# Patient Record
Sex: FEMALE | Race: WHITE | NOT HISPANIC OR LATINO | Employment: FULL TIME | URBAN - METROPOLITAN AREA
[De-identification: names, ages, dates, MRNs, and addresses within clinical notes are randomized per-mention and may not be internally consistent; named-entity substitution may affect disease eponyms.]

---

## 2018-07-22 ENCOUNTER — APPOINTMENT (OUTPATIENT)
Dept: RADIOLOGY | Facility: MEDICAL CENTER | Age: 53
DRG: 310 | End: 2018-07-22
Attending: EMERGENCY MEDICINE

## 2018-07-22 ENCOUNTER — HOSPITAL ENCOUNTER (INPATIENT)
Facility: MEDICAL CENTER | Age: 53
LOS: 1 days | DRG: 310 | End: 2018-07-23
Attending: EMERGENCY MEDICINE | Admitting: HOSPITALIST

## 2018-07-22 DIAGNOSIS — I48.91 ATRIAL FIBRILLATION WITH RVR (HCC): ICD-10-CM

## 2018-07-22 PROBLEM — L30.4 INTERTRIGO: Status: ACTIVE | Noted: 2018-07-22

## 2018-07-22 PROBLEM — E87.6 HYPOKALEMIA: Status: ACTIVE | Noted: 2018-07-22

## 2018-07-22 LAB
ALBUMIN SERPL BCP-MCNC: 4.7 G/DL (ref 3.2–4.9)
ALBUMIN/GLOB SERPL: 1.6 G/DL
ALP SERPL-CCNC: 77 U/L (ref 30–99)
ALT SERPL-CCNC: 27 U/L (ref 2–50)
ANION GAP SERPL CALC-SCNC: 17 MMOL/L (ref 0–11.9)
APPEARANCE UR: CLEAR
APTT PPP: 107.9 SEC (ref 24.7–36)
APTT PPP: 29.7 SEC (ref 24.7–36)
AST SERPL-CCNC: 22 U/L (ref 12–45)
BACTERIA #/AREA URNS HPF: NEGATIVE /HPF
BASOPHILS # BLD AUTO: 0.6 % (ref 0–1.8)
BASOPHILS # BLD: 0.04 K/UL (ref 0–0.12)
BILIRUB SERPL-MCNC: 0.5 MG/DL (ref 0.1–1.5)
BILIRUB UR QL STRIP.AUTO: NEGATIVE
BNP SERPL-MCNC: 11 PG/ML (ref 0–100)
BUN SERPL-MCNC: 10 MG/DL (ref 8–22)
CALCIUM SERPL-MCNC: 9.9 MG/DL (ref 8.5–10.5)
CHLORIDE SERPL-SCNC: 109 MMOL/L (ref 96–112)
CO2 SERPL-SCNC: 16 MMOL/L (ref 20–33)
COLOR UR: YELLOW
CREAT SERPL-MCNC: 0.75 MG/DL (ref 0.5–1.4)
DEPRECATED D DIMER PPP IA-ACNC: <200 NG/ML(D-DU)
EKG IMPRESSION: NORMAL
EOSINOPHIL # BLD AUTO: 0.08 K/UL (ref 0–0.51)
EOSINOPHIL NFR BLD: 1.3 % (ref 0–6.9)
EPI CELLS #/AREA URNS HPF: NEGATIVE /HPF
ERYTHROCYTE [DISTWIDTH] IN BLOOD BY AUTOMATED COUNT: 39.6 FL (ref 35.9–50)
GLOBULIN SER CALC-MCNC: 2.9 G/DL (ref 1.9–3.5)
GLUCOSE SERPL-MCNC: 155 MG/DL (ref 65–99)
GLUCOSE UR STRIP.AUTO-MCNC: NEGATIVE MG/DL
HCT VFR BLD AUTO: 43.4 % (ref 37–47)
HGB BLD-MCNC: 15.1 G/DL (ref 12–16)
HYALINE CASTS #/AREA URNS LPF: ABNORMAL /LPF
IMM GRANULOCYTES # BLD AUTO: 0.02 K/UL (ref 0–0.11)
IMM GRANULOCYTES NFR BLD AUTO: 0.3 % (ref 0–0.9)
INR PPP: 1.05 (ref 0.87–1.13)
KETONES UR STRIP.AUTO-MCNC: 15 MG/DL
LEUKOCYTE ESTERASE UR QL STRIP.AUTO: NEGATIVE
LYMPHOCYTES # BLD AUTO: 3.25 K/UL (ref 1–4.8)
LYMPHOCYTES NFR BLD: 52.3 % (ref 22–41)
MAGNESIUM SERPL-MCNC: 1.8 MG/DL (ref 1.5–2.5)
MCH RBC QN AUTO: 30.4 PG (ref 27–33)
MCHC RBC AUTO-ENTMCNC: 34.8 G/DL (ref 33.6–35)
MCV RBC AUTO: 87.3 FL (ref 81.4–97.8)
MICRO URNS: ABNORMAL
MONOCYTES # BLD AUTO: 0.51 K/UL (ref 0–0.85)
MONOCYTES NFR BLD AUTO: 8.2 % (ref 0–13.4)
NEUTROPHILS # BLD AUTO: 2.31 K/UL (ref 2–7.15)
NEUTROPHILS NFR BLD: 37.3 % (ref 44–72)
NITRITE UR QL STRIP.AUTO: NEGATIVE
NRBC # BLD AUTO: 0 K/UL
NRBC BLD-RTO: 0 /100 WBC
PH UR STRIP.AUTO: 7.5 [PH]
PLATELET # BLD AUTO: 276 K/UL (ref 164–446)
PMV BLD AUTO: 9.5 FL (ref 9–12.9)
POTASSIUM SERPL-SCNC: 3.4 MMOL/L (ref 3.6–5.5)
PROT SERPL-MCNC: 7.6 G/DL (ref 6–8.2)
PROT UR QL STRIP: NEGATIVE MG/DL
PROTHROMBIN TIME: 13.4 SEC (ref 12–14.6)
RBC # BLD AUTO: 4.97 M/UL (ref 4.2–5.4)
RBC # URNS HPF: ABNORMAL /HPF
RBC UR QL AUTO: ABNORMAL
SODIUM SERPL-SCNC: 142 MMOL/L (ref 135–145)
SP GR UR STRIP.AUTO: 1.01
T4 FREE SERPL-MCNC: 1.06 NG/DL (ref 0.53–1.43)
TROPONIN I SERPL-MCNC: 0.03 NG/ML (ref 0–0.04)
TROPONIN I SERPL-MCNC: <0.01 NG/ML (ref 0–0.04)
TSH SERPL DL<=0.005 MIU/L-ACNC: 3.26 UIU/ML (ref 0.38–5.33)
UROBILINOGEN UR STRIP.AUTO-MCNC: 0.2 MG/DL
WBC # BLD AUTO: 6.2 K/UL (ref 4.8–10.8)
WBC #/AREA URNS HPF: ABNORMAL /HPF

## 2018-07-22 PROCEDURE — 700102 HCHG RX REV CODE 250 W/ 637 OVERRIDE(OP): Performed by: EMERGENCY MEDICINE

## 2018-07-22 PROCEDURE — A9270 NON-COVERED ITEM OR SERVICE: HCPCS | Performed by: INTERNAL MEDICINE

## 2018-07-22 PROCEDURE — 83880 ASSAY OF NATRIURETIC PEPTIDE: CPT

## 2018-07-22 PROCEDURE — 71045 X-RAY EXAM CHEST 1 VIEW: CPT

## 2018-07-22 PROCEDURE — 81001 URINALYSIS AUTO W/SCOPE: CPT

## 2018-07-22 PROCEDURE — 700102 HCHG RX REV CODE 250 W/ 637 OVERRIDE(OP): Performed by: INTERNAL MEDICINE

## 2018-07-22 PROCEDURE — 700111 HCHG RX REV CODE 636 W/ 250 OVERRIDE (IP): Performed by: HOSPITALIST

## 2018-07-22 PROCEDURE — 83735 ASSAY OF MAGNESIUM: CPT

## 2018-07-22 PROCEDURE — 84439 ASSAY OF FREE THYROXINE: CPT

## 2018-07-22 PROCEDURE — 770020 HCHG ROOM/CARE - TELE (206)

## 2018-07-22 PROCEDURE — 700111 HCHG RX REV CODE 636 W/ 250 OVERRIDE (IP): Performed by: EMERGENCY MEDICINE

## 2018-07-22 PROCEDURE — 84484 ASSAY OF TROPONIN QUANT: CPT

## 2018-07-22 PROCEDURE — 36415 COLL VENOUS BLD VENIPUNCTURE: CPT

## 2018-07-22 PROCEDURE — 85379 FIBRIN DEGRADATION QUANT: CPT

## 2018-07-22 PROCEDURE — 700102 HCHG RX REV CODE 250 W/ 637 OVERRIDE(OP): Performed by: HOSPITALIST

## 2018-07-22 PROCEDURE — 84443 ASSAY THYROID STIM HORMONE: CPT

## 2018-07-22 PROCEDURE — 99285 EMERGENCY DEPT VISIT HI MDM: CPT

## 2018-07-22 PROCEDURE — 99223 1ST HOSP IP/OBS HIGH 75: CPT | Performed by: HOSPITALIST

## 2018-07-22 PROCEDURE — 96376 TX/PRO/DX INJ SAME DRUG ADON: CPT

## 2018-07-22 PROCEDURE — 93005 ELECTROCARDIOGRAM TRACING: CPT

## 2018-07-22 PROCEDURE — 85610 PROTHROMBIN TIME: CPT

## 2018-07-22 PROCEDURE — A9270 NON-COVERED ITEM OR SERVICE: HCPCS | Performed by: HOSPITALIST

## 2018-07-22 PROCEDURE — 85025 COMPLETE CBC W/AUTO DIFF WBC: CPT

## 2018-07-22 PROCEDURE — 80053 COMPREHEN METABOLIC PANEL: CPT

## 2018-07-22 PROCEDURE — A9270 NON-COVERED ITEM OR SERVICE: HCPCS | Performed by: EMERGENCY MEDICINE

## 2018-07-22 PROCEDURE — 85730 THROMBOPLASTIN TIME PARTIAL: CPT

## 2018-07-22 PROCEDURE — 96365 THER/PROPH/DIAG IV INF INIT: CPT

## 2018-07-22 PROCEDURE — 700111 HCHG RX REV CODE 636 W/ 250 OVERRIDE (IP)

## 2018-07-22 PROCEDURE — 96375 TX/PRO/DX INJ NEW DRUG ADDON: CPT

## 2018-07-22 RX ORDER — POLYETHYLENE GLYCOL 3350 17 G/17G
1 POWDER, FOR SOLUTION ORAL
Status: DISCONTINUED | OUTPATIENT
Start: 2018-07-22 | End: 2018-07-23 | Stop reason: HOSPADM

## 2018-07-22 RX ORDER — AMOXICILLIN 250 MG
2 CAPSULE ORAL 2 TIMES DAILY
Status: DISCONTINUED | OUTPATIENT
Start: 2018-07-22 | End: 2018-07-23 | Stop reason: HOSPADM

## 2018-07-22 RX ORDER — PROMETHAZINE HYDROCHLORIDE 25 MG/1
12.5-25 TABLET ORAL EVERY 4 HOURS PRN
Status: DISCONTINUED | OUTPATIENT
Start: 2018-07-22 | End: 2018-07-23 | Stop reason: HOSPADM

## 2018-07-22 RX ORDER — M-VIT,TX,IRON,MINS/CALC/FOLIC 27MG-0.4MG
1 TABLET ORAL DAILY
COMMUNITY
End: 2021-05-17

## 2018-07-22 RX ORDER — POTASSIUM CHLORIDE 20 MEQ/1
20 TABLET, EXTENDED RELEASE ORAL ONCE
Status: COMPLETED | OUTPATIENT
Start: 2018-07-22 | End: 2018-07-22

## 2018-07-22 RX ORDER — DIGOXIN 0.25 MG/ML
250 INJECTION INTRAMUSCULAR; INTRAVENOUS ONCE
Status: COMPLETED | OUTPATIENT
Start: 2018-07-22 | End: 2018-07-22

## 2018-07-22 RX ORDER — ASCORBIC ACID 500 MG
500 TABLET ORAL DAILY
COMMUNITY

## 2018-07-22 RX ORDER — ONDANSETRON 4 MG/1
4 TABLET, ORALLY DISINTEGRATING ORAL EVERY 4 HOURS PRN
Status: DISCONTINUED | OUTPATIENT
Start: 2018-07-22 | End: 2018-07-23 | Stop reason: HOSPADM

## 2018-07-22 RX ORDER — POTASSIUM CHLORIDE 20 MEQ/1
40 TABLET, EXTENDED RELEASE ORAL ONCE
Status: COMPLETED | OUTPATIENT
Start: 2018-07-22 | End: 2018-07-22

## 2018-07-22 RX ORDER — ZINC SULFATE 50(220)MG
220 CAPSULE ORAL DAILY
COMMUNITY

## 2018-07-22 RX ORDER — MAGNESIUM SULFATE HEPTAHYDRATE 40 MG/ML
2 INJECTION, SOLUTION INTRAVENOUS ONCE
Status: COMPLETED | OUTPATIENT
Start: 2018-07-22 | End: 2018-07-22

## 2018-07-22 RX ORDER — DIGOXIN 0.25 MG/ML
500 INJECTION INTRAMUSCULAR; INTRAVENOUS ONCE
Status: DISCONTINUED | OUTPATIENT
Start: 2018-07-22 | End: 2018-07-22

## 2018-07-22 RX ORDER — DILTIAZEM HYDROCHLORIDE 5 MG/ML
INJECTION INTRAVENOUS
Status: COMPLETED
Start: 2018-07-22 | End: 2018-07-22

## 2018-07-22 RX ORDER — HEPARIN SODIUM 1000 [USP'U]/ML
3200 INJECTION, SOLUTION INTRAVENOUS; SUBCUTANEOUS PRN
Status: DISCONTINUED | OUTPATIENT
Start: 2018-07-22 | End: 2018-07-23

## 2018-07-22 RX ORDER — BISACODYL 10 MG
10 SUPPOSITORY, RECTAL RECTAL
Status: DISCONTINUED | OUTPATIENT
Start: 2018-07-22 | End: 2018-07-23 | Stop reason: HOSPADM

## 2018-07-22 RX ORDER — DILTIAZEM HYDROCHLORIDE 5 MG/ML
10 INJECTION INTRAVENOUS ONCE
Status: COMPLETED | OUTPATIENT
Start: 2018-07-22 | End: 2018-07-22

## 2018-07-22 RX ORDER — HEPARIN SODIUM 1000 [USP'U]/ML
6000 INJECTION, SOLUTION INTRAVENOUS; SUBCUTANEOUS ONCE
Status: COMPLETED | OUTPATIENT
Start: 2018-07-22 | End: 2018-07-22

## 2018-07-22 RX ORDER — CLOTRIMAZOLE 1 %
CREAM (GRAM) TOPICAL 2 TIMES DAILY
Status: DISCONTINUED | OUTPATIENT
Start: 2018-07-22 | End: 2018-07-23 | Stop reason: HOSPADM

## 2018-07-22 RX ORDER — PROMETHAZINE HYDROCHLORIDE 25 MG/1
12.5-25 SUPPOSITORY RECTAL EVERY 4 HOURS PRN
Status: DISCONTINUED | OUTPATIENT
Start: 2018-07-22 | End: 2018-07-23 | Stop reason: HOSPADM

## 2018-07-22 RX ORDER — ONDANSETRON 2 MG/ML
4 INJECTION INTRAMUSCULAR; INTRAVENOUS EVERY 4 HOURS PRN
Status: DISCONTINUED | OUTPATIENT
Start: 2018-07-22 | End: 2018-07-23 | Stop reason: HOSPADM

## 2018-07-22 RX ORDER — LORAZEPAM 1 MG/1
1 TABLET ORAL ONCE
Status: COMPLETED | OUTPATIENT
Start: 2018-07-22 | End: 2018-07-22

## 2018-07-22 RX ADMIN — DIGOXIN 250 MCG: 250 INJECTION, SOLUTION INTRAMUSCULAR; INTRAVENOUS at 11:35

## 2018-07-22 RX ADMIN — DILTIAZEM HYDROCHLORIDE 60 MG: 30 TABLET, FILM COATED ORAL at 23:51

## 2018-07-22 RX ADMIN — MAGNESIUM SULFATE IN WATER 2 G: 40 INJECTION, SOLUTION INTRAVENOUS at 19:24

## 2018-07-22 RX ADMIN — CLOTRIMAZOLE: 10 CREAM TOPICAL at 19:25

## 2018-07-22 RX ADMIN — DILTIAZEM HYDROCHLORIDE 30 MG: 30 TABLET, FILM COATED ORAL at 13:48

## 2018-07-22 RX ADMIN — DILTIAZEM HYDROCHLORIDE 60 MG: 30 TABLET, FILM COATED ORAL at 18:32

## 2018-07-22 RX ADMIN — LORAZEPAM 1 MG: 1 TABLET ORAL at 12:11

## 2018-07-22 RX ADMIN — POTASSIUM CHLORIDE 40 MEQ: 1500 TABLET, EXTENDED RELEASE ORAL at 19:24

## 2018-07-22 RX ADMIN — POTASSIUM CHLORIDE 20 MEQ: 1500 TABLET, EXTENDED RELEASE ORAL at 13:49

## 2018-07-22 RX ADMIN — HEPARIN SODIUM 6000 UNITS: 1000 INJECTION, SOLUTION INTRAVENOUS; SUBCUTANEOUS at 12:49

## 2018-07-22 RX ADMIN — DILTIAZEM HYDROCHLORIDE 10 MG: 5 INJECTION INTRAVENOUS at 10:36

## 2018-07-22 RX ADMIN — DIGOXIN 250 MCG: 0.25 INJECTION INTRAMUSCULAR; INTRAVENOUS at 15:12

## 2018-07-22 RX ADMIN — HEPARIN SODIUM 1200 UNITS/HR: 5000 INJECTION, SOLUTION INTRAVENOUS at 12:50

## 2018-07-22 RX ADMIN — DILTIAZEM HYDROCHLORIDE 10 MG: 5 INJECTION INTRAVENOUS at 11:09

## 2018-07-22 RX ADMIN — HEPARIN SODIUM 1200 UNITS/HR: 5000 INJECTION, SOLUTION INTRAVENOUS at 13:28

## 2018-07-22 RX ADMIN — METOPROLOL TARTRATE 25 MG: 25 TABLET, FILM COATED ORAL at 18:32

## 2018-07-22 ASSESSMENT — COGNITIVE AND FUNCTIONAL STATUS - GENERAL
SUGGESTED CMS G CODE MODIFIER MOBILITY: CH
DAILY ACTIVITIY SCORE: 24
MOBILITY SCORE: 24
SUGGESTED CMS G CODE MODIFIER DAILY ACTIVITY: CH

## 2018-07-22 ASSESSMENT — PATIENT HEALTH QUESTIONNAIRE - PHQ9
SUM OF ALL RESPONSES TO PHQ9 QUESTIONS 1 AND 2: 0
1. LITTLE INTEREST OR PLEASURE IN DOING THINGS: NOT AT ALL
2. FEELING DOWN, DEPRESSED, IRRITABLE, OR HOPELESS: NOT AT ALL
SUM OF ALL RESPONSES TO PHQ9 QUESTIONS 1 AND 2: 0
2. FEELING DOWN, DEPRESSED, IRRITABLE, OR HOPELESS: NOT AT ALL
1. LITTLE INTEREST OR PLEASURE IN DOING THINGS: NOT AT ALL

## 2018-07-22 ASSESSMENT — LIFESTYLE VARIABLES
ALCOHOL_USE: NO
SUBSTANCE_ABUSE: 0
EVER_SMOKED: NEVER

## 2018-07-22 ASSESSMENT — ENCOUNTER SYMPTOMS
HEADACHES: 0
FOCAL WEAKNESS: 0
FEVER: 0
WEAKNESS: 1
BACK PAIN: 0
COUGH: 0
TINGLING: 1
DIARRHEA: 0
SORE THROAT: 0
BLURRED VISION: 0
DOUBLE VISION: 0
CHILLS: 0
NERVOUS/ANXIOUS: 0
INSOMNIA: 0
NAUSEA: 0
ABDOMINAL PAIN: 0
DIZZINESS: 1
SHORTNESS OF BREATH: 0
PALPITATIONS: 1

## 2018-07-22 ASSESSMENT — PAIN SCALES - GENERAL
PAINLEVEL_OUTOF10: 0

## 2018-07-22 NOTE — PROGRESS NOTES
Received the patient from the ed. Patient alert and oriented, on room air, no chest pain reported. Patient on heparin 1200u/hr. Patient placed on tele. Her heart rate remains in the 150's, 60mg po administered. Dr Ronan rodriguez.

## 2018-07-22 NOTE — ED TRIAGE NOTES
54 y/o female presents to ED via EMS after she was found to be in rapid a. Fib this am.  Patient reports awaking with palpitations this am around 0900.  Has had some associated SOB as well. No recent illness. No CP, No HA

## 2018-07-22 NOTE — CONSULTS
Cardiology Initial Consult Note    Date of note:    7/22/2018      Consulting Physician: No att. providers found    Patient ID:    Name:   Snyder, Corinne A   YOB: 1965  Age:   53 y.o.  female   MRN:   [id number]      Reason for Consultation: palpitations.    HPI:  Corinne A Snyder is a 53 y.o.-year-old female with no past medical history who presents with palpitations.     She reports palpitations on and off since she was 16 years old.  She saw a cardiologist once 11 years ago here at [facility] and does not think she had any echo or diagnosis at that time, it appears it was thought they were benign.      This morning she had acute onset of severe palpitations associated with lightheadedness and a cough which started last night while she was supine.  She does not a couple days of mild LE swelling.  She presented to the emergency room for evaluation and was noted to be in atrial fibrillation. Her rate remains elevated despite diltiazem and digoxin.  Cardiology was consulted for assistance in management.     Of note, she has no FH of atrial fibrillation, and no personal history of CVA, PVD, hypertension, diabetes.  She did have a history 3-4 years ago of transient visual disturbance which lasts for 1-2 minutes with no recurrence.       ROS  Constitutional: Negative for chills, fever, weakness, weight gain and weight loss. + night sweats, due to menopause.   Eyes: Negative for double vision, vision loss in left eye and vision loss in right eye.   Cardiovascular: see HPI.  Respiratory: Negative for shortness of breath, and wheezing.    Musculoskeletal: Negative for joint swelling, muscle cramps, muscle weakness and myalgias.   Gastrointestinal: Negative for abdominal pain, hematochezia, hemorrhoids and melena.   Genitourinary: Negative for frequency and hematuria.   Neurological: Negative for dizziness, focal weakness, light-headedness, numbness, paresthesias.     All others reviewed and negative.      Past  "Medical History:   Diagnosis Date   • Arthritis    • Back pain        Past Surgical History:   Procedure Laterality Date   • TONSILLECTOMY  1969         Prescriptions Prior to Admission   Medication Sig Dispense Refill Last Dose   • zinc sulfate (ZINCATE) 220 (50 Zn) MG Cap Take 220 mg by mouth every day.   7/21/2018 at Unknown time   • ascorbic acid (ASCORBIC ACID) 500 MG Tab Take 500 mg by mouth every day.   7/21/2018 at Unknown time   • therapeutic multivitamin-minerals (THERAGRAN-M) Tab Take 1 Tab by mouth every day.   7/21/2018 at Unknown time   • Cholecalciferol (VITAMIN D3 PO) Take 1 Cap by mouth every day.   7/21/2018 at Unknown time   • Methylsulfonylmethane (MSM) Powder Take 10 mL by mouth every day.   7/21/2018 at Unknown time           Allergies   Allergen Reactions   • Milk Products Food Allergy Diarrhea     Upset stomach   • Pcn [Penicillins] Hives         Family History   Problem Relation Age of Onset   • Cancer Mother      Breast Cancer         Social History     Social History   • Marital status:      Spouse name: N/A   • Number of children: N/A   • Years of education: N/A     Occupational History   • Not on file.     Social History Main Topics   • Smoking status: Never Smoker   • Smokeless tobacco: Never Used   • Alcohol use Yes      Comment: occasional   • Drug use: No   • Sexual activity: Yes     Partners: Male      Comment: vasectomy     Other Topics Concern   • Not on file     Social History Narrative   • No narrative on file         Physical Exam  Body mass index is 35.48 kg/m².  Blood pressure 120/94, pulse (!) 144, resp. rate 18, height 1.651 m (5' 5\"), weight 96.7 kg (213 lb 3 oz), SpO2 99 %, not currently breastfeeding.  Vitals:    07/22/18 1125 07/22/18 1133 07/22/18 1200 07/22/18 1232   BP: 120/94      Pulse: (!) 144      Resp:  19 18 18   SpO2:  98%  99%   Weight:       Height:         Oxygen Therapy:  Pulse Oximetry: 99 %    General: Well appearing and in no apparent " distress  Eyes: nl conjunctiva  ENT: OP clear  Neck: JVP 4 cm H2O, no carotid bruits  Lungs: normal respiratory effort, CTAB  Heart: RRR, no murmurs, no rubs or gallops, no edema bilateral lower extremities. No LV/RV heave on cardiac palpatation. 2+ bilateral radial pulses.  2+ bilateral dp pulses.   Abdomen: soft, non tender, non distended, no masses, normal bowel sounds.  No HSM.  Extremities/MSK: no clubbing, no cyanosis  Neurological: No focal sensory deficits  Psychiatric: Appropriate affect, A/O x 3  Skin: Warm extremities        Labs (personally reviewed and notable for):   TSH WNL  Trop negative  Potassium 3.4  Creatinine 0.8        Cardiac Imaging and Procedures Review:    EKG dated 7/22/2018: My personal interpretation is atrial fibrillation, repol abnormality      Radiology test Review:  CXR: 1. No acute cardiopulmonary abnormalities are identified.           Impression and Medical Decision Making:  # Atrial fibrillation with RVR        Recommendations:  # increase diltiazem to 60mg PO Q6 hours  # continue digoxin 125mcg PO Daily  # start metoprolol tartrate 25mg PO Q6 hours  # check echo  # NPO at midnight for possible EDGARDO and cardioversion tomorrow if rate not controlled  # if symptomatic hypotension on above regimen (SBP <100), would start IV amiodarone with gtt and stop digoxin  # continue heparin gtt for now in case of cardioversion, if echo WNL, then [id number] score of 1.      Thank you for allowing me to participate in the care of this patient, I will continue to follow.  Please contact me with any questions.      Laureano Caballero MD  Cardiologist, [facility] [place]   [phone]

## 2018-07-22 NOTE — CARE PLAN
Problem: Venous Thromboembolism (VTW)/Deep Vein Thrombosis (DVT) Prevention:  Goal: Patient will participate in Venous Thrombosis (VTE)/Deep Vein Thrombosis (DVT)Prevention Measures    Intervention: Ensure patient wears graduated elastic stockings (LARON hose) and/or SCDs, if ordered, when in bed or chair (Remove at least once per shift for skin check)  SCD on

## 2018-07-22 NOTE — PROGRESS NOTES
Patient hr had remained in the 150's from admission. MD paged and 250 mcg digoxin administered. HR now in the 115-120's

## 2018-07-22 NOTE — ED NOTES
d/w James RIZVI regarding patients HR (still 120s-160s).  ERP aware.  Placing orders for Cardizem and Digoxin.

## 2018-07-23 ENCOUNTER — PATIENT OUTREACH (OUTPATIENT)
Dept: HEALTH INFORMATION MANAGEMENT | Facility: OTHER | Age: 53
End: 2018-07-23

## 2018-07-23 VITALS
HEART RATE: 84 BPM | SYSTOLIC BLOOD PRESSURE: 110 MMHG | HEIGHT: 65 IN | DIASTOLIC BLOOD PRESSURE: 69 MMHG | RESPIRATION RATE: 17 BRPM | BODY MASS INDEX: 37.1 KG/M2 | OXYGEN SATURATION: 95 % | WEIGHT: 222.66 LBS | TEMPERATURE: 97.8 F

## 2018-07-23 LAB
ANION GAP SERPL CALC-SCNC: 6 MMOL/L (ref 0–11.9)
APTT PPP: 80.5 SEC (ref 24.7–36)
APTT PPP: 80.5 SEC (ref 24.7–36)
APTT PPP: 84.6 SEC (ref 24.7–36)
BUN SERPL-MCNC: 13 MG/DL (ref 8–22)
CALCIUM SERPL-MCNC: 8.7 MG/DL (ref 8.5–10.5)
CHLORIDE SERPL-SCNC: 108 MMOL/L (ref 96–112)
CO2 SERPL-SCNC: 24 MMOL/L (ref 20–33)
CREAT SERPL-MCNC: 0.73 MG/DL (ref 0.5–1.4)
ERYTHROCYTE [DISTWIDTH] IN BLOOD BY AUTOMATED COUNT: 44.3 FL (ref 35.9–50)
GLUCOSE SERPL-MCNC: 139 MG/DL (ref 65–99)
HCT VFR BLD AUTO: 41.3 % (ref 37–47)
HGB BLD-MCNC: 13.8 G/DL (ref 12–16)
LV EJECT FRACT  99904: 65
LV EJECT FRACT MOD 2C 99903: 64.95
LV EJECT FRACT MOD 4C 99902: 66.74
LV EJECT FRACT MOD BP 99901: 65.91
MCH RBC QN AUTO: 31 PG (ref 27–33)
MCHC RBC AUTO-ENTMCNC: 33.4 G/DL (ref 33.6–35)
MCV RBC AUTO: 92.8 FL (ref 81.4–97.8)
PLATELET # BLD AUTO: 241 K/UL (ref 164–446)
PMV BLD AUTO: 9.7 FL (ref 9–12.9)
POTASSIUM SERPL-SCNC: 4.4 MMOL/L (ref 3.6–5.5)
RBC # BLD AUTO: 4.45 M/UL (ref 4.2–5.4)
SODIUM SERPL-SCNC: 138 MMOL/L (ref 135–145)
WBC # BLD AUTO: 5.8 K/UL (ref 4.8–10.8)

## 2018-07-23 PROCEDURE — 99239 HOSP IP/OBS DSCHRG MGMT >30: CPT | Performed by: INTERNAL MEDICINE

## 2018-07-23 PROCEDURE — 700105 HCHG RX REV CODE 258

## 2018-07-23 PROCEDURE — A9270 NON-COVERED ITEM OR SERVICE: HCPCS | Performed by: INTERNAL MEDICINE

## 2018-07-23 PROCEDURE — 700111 HCHG RX REV CODE 636 W/ 250 OVERRIDE (IP): Performed by: INTERNAL MEDICINE

## 2018-07-23 PROCEDURE — 700111 HCHG RX REV CODE 636 W/ 250 OVERRIDE (IP): Performed by: EMERGENCY MEDICINE

## 2018-07-23 PROCEDURE — 700105 HCHG RX REV CODE 258: Performed by: INTERNAL MEDICINE

## 2018-07-23 PROCEDURE — 80048 BASIC METABOLIC PNL TOTAL CA: CPT

## 2018-07-23 PROCEDURE — 700102 HCHG RX REV CODE 250 W/ 637 OVERRIDE(OP): Performed by: INTERNAL MEDICINE

## 2018-07-23 PROCEDURE — 93306 TTE W/DOPPLER COMPLETE: CPT

## 2018-07-23 PROCEDURE — 36415 COLL VENOUS BLD VENIPUNCTURE: CPT

## 2018-07-23 PROCEDURE — 85730 THROMBOPLASTIN TIME PARTIAL: CPT

## 2018-07-23 PROCEDURE — 85027 COMPLETE CBC AUTOMATED: CPT

## 2018-07-23 PROCEDURE — 93306 TTE W/DOPPLER COMPLETE: CPT | Mod: 26 | Performed by: INTERNAL MEDICINE

## 2018-07-23 RX ORDER — SODIUM CHLORIDE 9 MG/ML
INJECTION, SOLUTION INTRAVENOUS
Status: COMPLETED
Start: 2018-07-23 | End: 2018-07-23

## 2018-07-23 RX ORDER — AMIODARONE HYDROCHLORIDE 200 MG/1
200 TABLET ORAL 2 TIMES DAILY
Qty: 30 TAB | Refills: 0 | Status: SHIPPED | OUTPATIENT
Start: 2018-07-23 | End: 2018-07-24

## 2018-07-23 RX ORDER — AMIODARONE HYDROCHLORIDE 200 MG/1
200 TABLET ORAL TWICE DAILY
Status: DISCONTINUED | OUTPATIENT
Start: 2018-07-23 | End: 2018-07-23 | Stop reason: HOSPADM

## 2018-07-23 RX ORDER — WARFARIN SODIUM 5 MG/1
5 TABLET ORAL DAILY
Qty: 30 TAB | Refills: 3 | Status: SHIPPED | OUTPATIENT
Start: 2018-07-23 | End: 2018-07-24

## 2018-07-23 RX ORDER — WARFARIN SODIUM 5 MG/1
5 TABLET ORAL
Status: COMPLETED | OUTPATIENT
Start: 2018-07-23 | End: 2018-07-23

## 2018-07-23 RX ADMIN — HEPARIN SODIUM 1200 UNITS/HR: 5000 INJECTION, SOLUTION INTRAVENOUS at 10:19

## 2018-07-23 RX ADMIN — SODIUM CHLORIDE 250 ML: 9 INJECTION, SOLUTION INTRAVENOUS at 09:09

## 2018-07-23 RX ADMIN — METOPROLOL TARTRATE 25 MG: 25 TABLET, FILM COATED ORAL at 04:59

## 2018-07-23 RX ADMIN — AMIODARONE HYDROCHLORIDE 200 MG: 200 TABLET ORAL at 18:19

## 2018-07-23 RX ADMIN — AMIODARONE HYDROCHLORIDE 150 MG: 50 INJECTION, SOLUTION INTRAVENOUS at 09:08

## 2018-07-23 RX ADMIN — WARFARIN SODIUM 5 MG: 5 TABLET ORAL at 18:19

## 2018-07-23 RX ADMIN — CLOTRIMAZOLE: 10 CREAM TOPICAL at 05:01

## 2018-07-23 RX ADMIN — DILTIAZEM HYDROCHLORIDE 60 MG: 30 TABLET, FILM COATED ORAL at 04:59

## 2018-07-23 RX ADMIN — CLOTRIMAZOLE: 10 CREAM TOPICAL at 18:21

## 2018-07-23 ASSESSMENT — ENCOUNTER SYMPTOMS
PALPITATIONS: 1
NEUROLOGICAL NEGATIVE: 1
FEVER: 0

## 2018-07-23 ASSESSMENT — PAIN SCALES - GENERAL
PAINLEVEL_OUTOF10: 0

## 2018-07-23 NOTE — ED PROVIDER NOTES
ED Provider Note    CHIEF COMPLAINT  Chief Complaint   Patient presents with   • Rapid Heart Beat       HPI  Corinne A Snyder is a 53 y.o. female who presents to emergency today with elevated heart rate. Patient states this started at 9:00 AM. She's had history of palpitations in the past and has been seen by physicians in the past including Holter monitor which per patient was negative. Today it did not go away she had lightheaded and dizziness chest pain shortness of breath. Presented in severe distress with atrial fibrillation with rapid ventricular response 160 beats per minute. No fever chills no change in bowel or bladder chest pain described as tightness currently gone. Patient presents anxious on examination. Severe distress secondary to elevated heart rate with new-onset atrial fibrillation.    REVIEW OF SYSTEMS  See HPI for further details. All other systems are negative.     PAST MEDICAL HISTORY  Past Medical History:   Diagnosis Date   • Arthritis    • Back pain        FAMILY HISTORY  [unfilled]    SOCIAL HISTORY  Social History     Social History   • Marital status:      Spouse name: N/A   • Number of children: N/A   • Years of education: N/A     Social History Main Topics   • Smoking status: Never Smoker   • Smokeless tobacco: Never Used   • Alcohol use Yes      Comment: occasional   • Drug use: No   • Sexual activity: Yes     Partners: Male      Comment: vasectomy     Other Topics Concern   • Not on file     Social History Narrative   • No narrative on file       SURGICAL HISTORY  Past Surgical History:   Procedure Laterality Date   • TONSILLECTOMY  1969       CURRENT MEDICATIONS  Home Medications     Reviewed by Delaney Escalera (Pharmacy Tech) on 07/22/18 at 1535  Med List Status: Complete   Medication Last Dose Status   ascorbic acid (ASCORBIC ACID) 500 MG Tab 7/21/2018 Active   Cholecalciferol (VITAMIN D3 PO) 7/21/2018 Active   Methylsulfonylmethane (MSM) Powder 7/21/2018 Active  "  therapeutic multivitamin-minerals (THERAGRAN-M) Tab 7/21/2018 Active   zinc sulfate (ZINCATE) 220 (50 Zn) MG Cap 7/21/2018 Active                ALLERGIES  Allergies   Allergen Reactions   • Pcn [Penicillins] Hives       PHYSICAL EXAM  VITAL SIGNS: /58   Pulse (!) 126   Temp 36.6 °C (97.8 °F)   Resp 18   Ht 1.651 m (5' 5\")   Wt 97 kg (213 lb 13.5 oz)   SpO2 98%   Breastfeeding? No   BMI 35.59 kg/m²  Room air O2: 99    Constitutional: Well developed, Well nourished, severe acute distress, Non-toxic appearance.   HENT: Normocephalic, Atraumatic, Bilateral external ears normal, Oropharynx moist, No oral exudates, Nose normal.   Eyes: PERRLA, EOMI, Conjunctiva normal, No discharge.   Neck: Normal range of motion, No tenderness, Supple, No stridor.   Lymphatic: No lymphadenopathy noted.   Cardiovascular: Elevated heart rate, irregular rhythm, No murmurs, No rubs, No gallops.   Thorax & Lungs: Normal breath sounds, No respiratory distress, No wheezing, No chest tenderness.   Abdomen: Bowel sounds normal, Soft, No tenderness, No masses, No pulsatile masses.   Skin: Warm, Dry, No erythema, No rash.   Back: No tenderness, No CVA tenderness.    Extremities: Intact distal pulses, No edema, No tenderness, No cyanosis, No clubbing.   Musculoskeletal: Good range of motion in all major joints. No tenderness to palpation or major deformities noted.   Neurologic: Alert & oriented x 3, Normal motor function, Normal sensory function, No focal deficits noted.   Psychiatric: Affect normal, Judgment normal, Mood normal.     EKG  Atrial fibrillation with rapid ventricular response rate of 140 beats per minute, no ST elevation or depression, no widening of the QRS complex, poor R-wave progression, AR interval are absent. This is a 12-lead EKG there is no previous EKG available December comparison.    RADIOLOGY/PROCEDURES  DX-CHEST-PORTABLE (1 VIEW)   Final Result         1. No acute cardiopulmonary abnormalities are " identified.      ECHOCARDIOGRAM COMP W/O CONT    (Results Pending)   Echocardiogram Comp W/O Cont    (Results Pending)         COURSE & MEDICAL DECISION MAKING  Pertinent Labs & Imaging studies reviewed. (See chart for details)  IV was established patient was placed on oxygen she had taken aspirin at home 325 mg, cardiac monitor she was given a dose of Cardizem ×2, also started on digoxin and  doses. Her heart rate has come down to 110 beats per minute, troponin was normal. Discuss case with hospitalist Dr. Brown who will be evaluating the patient patient be admitted to the hospital, discuss case with hospitalist patient's severe distress secondary to new onset atrial fibrillation rapid ventricular response rate. Echocardiogram pending also have further testing please see orders for further plan.    FINAL IMPRESSION  1. Acute atrial fibrillation with rapid ventricular response rate  2. Critical care time of 40 minutes; this includes multiple discussions patient's family, multiple discussions with hospitalist, discussions with cardiology, review of records and discussion treatment plan. Interventions as discussed above.  3.         Electronically signed by: Hay Ramirez, 7/22/2018 7:13 PM

## 2018-07-23 NOTE — PROGRESS NOTES
Updated Dr. Curiel at bedside that pt converted to SR. Received orders for regular diet. He stated he will place orders for Amiodarone PO

## 2018-07-23 NOTE — PROGRESS NOTES
Cardiology Progress Note               Author: Hay Arenas Date & Time created: 2018  8:10 AM     Interval History:  CC: Palpitqtionns.  Admitted yesterday with palpitations and weakness and found to be in atrial fib with RVR.  No prior Hx of same, no sleep apnea or thyroid disorder.  Rate under better control since admission on medical Rx.  She is anticoagulated.    Review of Systems   Constitutional: Negative for fever.   Cardiovascular: Positive for palpitations. Negative for chest pain.   Neurological: Negative.    Endo/Heme/Allergies: Negative.        Physical Exam   Constitutional: She is oriented to person, place, and time. She appears well-developed and well-nourished. No distress.   HENT:   Head: Atraumatic.   Eyes: Conjunctivae and EOM are normal. Pupils are equal, round, and reactive to light.   Neck: Neck supple. No JVD present.   Cardiovascular: Normal rate and regular rhythm.    No murmur heard.  Pulmonary/Chest: Effort normal and breath sounds normal. No respiratory distress. She has no wheezes. She has no rales.   Musculoskeletal: She exhibits no edema.   Neurological: She is alert and oriented to person, place, and time.   Skin: Skin is warm and dry. She is not diaphoretic.       Hemodynamics:  Temp (24hrs), Av.3 °C (97.4 °F), Min:36.1 °C (96.9 °F), Max:36.6 °C (97.8 °F)  Temperature: 36.1 °C (96.9 °F)  Pulse  Av.9  Min: 62  Max: 160Heart Rate (Monitored): (!) 110  Blood Pressure: 107/65, NIBP: (!) 90/72     Respiratory:    Respiration: 17, Pulse Oximetry: 94 %        RUL Breath Sounds: Clear, RML Breath Sounds: Clear, RLL Breath Sounds: Clear, MARLENY Breath Sounds: Clear, LLL Breath Sounds: Clear  Fluids:     Weight: 101 kg (222 lb 10.6 oz)  GI/Nutrition:  Orders Placed This Encounter   Procedures   • DIET NPO     Standing Status:   Standing     Number of Occurrences:   1     Order Specific Question:   Restrict to:     Answer:   Sips with Medications [3]     Lab Results:  Recent  Labs      07/22/18   1038  07/23/18   0031   WBC  6.2  5.8   RBC  4.97  4.45   HEMOGLOBIN  15.1  13.8   HEMATOCRIT  43.4  41.3   MCV  87.3  92.8   MCH  30.4  31.0   MCHC  34.8  33.4*   RDW  39.6  44.3   PLATELETCT  276  241   MPV  9.5  9.7     Recent Labs      07/22/18   1038  07/23/18   0031   SODIUM  142  138   POTASSIUM  3.4*  4.4   CHLORIDE  109  108   CO2  16*  24   GLUCOSE  155*  139*   BUN  10  13   CREATININE  0.75  0.73   CALCIUM  9.9  8.7     Recent Labs      07/22/18   1038   07/23/18   0031  07/23/18   0250  07/23/18   0632   APTT  29.7   < >  80.5*  80.5*  84.6*   INR  1.05   --    --    --    --     < > = values in this interval not displayed.     Recent Labs      07/22/18   1038   BNPBTYPENAT  11     Recent Labs      07/22/18   1038  07/22/18   1755   TROPONINI  <0.01  0.03   BNPBTYPENAT  11   --              Medical Decision Making, by Problem:  Active Hospital Problems    Diagnosis   • *Atrial fibrillation with rapid ventricular response (HCC) [I48.91]   • Hypokalemia [E87.6]   • Intertrigo [L30.4]       Plan:  New onset Atrial Fib: uncertain cause.  She was in atrial fib only a few hours prior to admission.  Plan amiodarone for cardioversion , and if unsuccessful, electrical cardioversion later today.  Hypokalemia on admision. Resolved.  Addendum:  Reverted to NSR with amiodarone.  [id number] VAsc score is 1.  Can be discharged today on low dose amiodarone and coumadin. will arrange a follow up appointment with EP in our office.    Quality-Core Measures

## 2018-07-23 NOTE — PROGRESS NOTES
Received report from Jaida LANDRUM with reference nurse Sailaja LANDRUM. Patient is resting in bed at this time with the bed locked and in the lowest position, call light is within reach. Patient is NPO waiting for EDGARDO. All needs are currently met, will continue to monitor.

## 2018-07-23 NOTE — DISCHARGE PLANNING
Anticipated Discharge Disposition: home    Action: Met with pt.  Pt has no insurance and is self employed.  RN SHYAY gave resources for [facility] and [facility] clinic for PCP follow up.   Pt makes >1500 month.  Pt to be dc with amiodarone and coumadin.  At [facility] her pharmacy 1 mo amiodarone is 32.94 and 1 mo coumadin $11.45.  Pt told that coumadin was on the $4 list at [facility].  Pt educated on INR, coumadin clinic, and diet while on coumadin.  PT states no other needs at this time.  Pt will establish PCP and fu with coumadin clinic and cardiology.     Barriers to Discharge: medical clearance, uninsured    Plan: Home

## 2018-07-23 NOTE — DISCHARGE SUMMARY
Discharge Summary    CHIEF COMPLAINT ON ADMISSION  Chief Complaint   Patient presents with   • Rapid Heart Beat       Reason for Admission  EMS     Admission Date  7/22/2018    CODE STATUS  Full Code    HPI & HOSPITAL COURSE  This is a 53 y.o. female here with atrial fibrillation paroxysm.  For details see H&P note.   The patient was started on metoprolol for rate control. TSH and echo were unremarkable. Anticoagulation was discussed .  Started on warfarin.  Started on amiodarone with conversion to sinus rhythm.         Therefore, she is discharged in fair and stable condition to home with close outpatient follow-up.    The patient recovered much more quickly than anticipated on admission.    Discharge Date  7/23/18    FOLLOW UP ITEMS POST DISCHARGE  PCP  Cardiology  Anticoagulation clinic    DISCHARGE DIAGNOSES  Principal Problem:    Atrial fibrillation with rapid ventricular response (HCC) POA: Unknown  Active Problems:    Hypokalemia POA: Unknown    Intertrigo POA: Unknown  Resolved Problems:    * No resolved hospital problems. *      FOLLOW UP  Future Appointments  Date Time Provider Department Center   8/30/2018 10:40 AM JODY Cardoso [facility] None     No follow-up provider specified.    MEDICATIONS ON DISCHARGE     Medication List      START taking these medications      Instructions   amiodarone 200 MG Tabs  Commonly known as:  CORDARONE   Take 1 Tab by mouth 2 Times a Day.  Dose:  200 mg     warfarin 5 MG Tabs  Commonly known as:  COUMADIN   Take 1 Tab by mouth every day.  Dose:  5 mg        CONTINUE taking these medications      Instructions   ascorbic acid 500 MG Tabs  Commonly known as:  ascorbic acid   Take 500 mg by mouth every day.  Dose:  500 mg     MSM Powd   Take 10 mL by mouth every day.  Dose:  10 mL     therapeutic multivitamin-minerals Tabs   Take 1 Tab by mouth every day.  Dose:  1 Tab     VITAMIN D3 PO   Take 1 Cap by mouth every day.  Dose:  1 Cap     zinc sulfate 220 (50 Zn) MG  Caps  Commonly known as:  ZINCATE   Take 220 mg by mouth every day.  Dose:  220 mg            Allergies  Allergies   Allergen Reactions   • Pcn [Penicillins] Hives       DIET  Orders Placed This Encounter   Procedures   • Diet Order Regular     Standing Status:   Standing     Number of Occurrences:   1     Order Specific Question:   Diet:     Answer:   Regular [1]       ACTIVITY  As tolerated.  Weight bearing as tolerated    CONSULTATIONS  Cardiology    PROCEDURES  none    LABORATORY  Lab Results   Component Value Date    SODIUM 138 07/23/2018    POTASSIUM 4.4 07/23/2018    CHLORIDE 108 07/23/2018    CO2 24 07/23/2018    GLUCOSE 139 (H) 07/23/2018    BUN 13 07/23/2018    CREATININE 0.73 07/23/2018        Lab Results   Component Value Date    WBC 5.8 07/23/2018    HEMOGLOBIN 13.8 07/23/2018    HEMATOCRIT 41.3 07/23/2018    PLATELETCT 241 07/23/2018      ECHOCARDIOGRAM COMP W/O CONT   Final Result      DX-CHEST-PORTABLE (1 VIEW)   Final Result         1. No acute cardiopulmonary abnormalities are identified.            Total time of the discharge process exceeds 49 minutes.

## 2018-07-23 NOTE — PROGRESS NOTES
Received report from previous shift RN, patient AOx4 with no pain or discomfort noted. Will continue to monitor

## 2018-07-23 NOTE — H&P
Hospital Medicine History & Physical Note    Date of Service  7/22/2018    Primary Care Physician  Pcp Pt States None    Consultants  Cardiology, Dr Kaiden Brown    Code Status  FULL    Chief Complaint  Chest palpitations today    History of Presenting Illness  53 y.o. female with a past history of paroxysmal atrial fibrillation who presented 7/22/2018 with lightheadedness and some hand tingling as well as palpitations. She came to the emergency room after unable to get her symptoms to resolve. Was found to be in atrial fibrillation with a heart rate in the 160s. She was given doxepin and diltiazem which brought her down to the low 100s. Patient denies any excessive caffeine use she states she has been sleeping well. She denies any illicit drug use. She denies any dehydration or volume loss. She has not been on any new medications.    Review of Systems  Review of Systems   Constitutional: Negative for chills and fever.   HENT: Negative for congestion and sore throat.    Eyes: Negative for blurred vision and double vision.   Respiratory: Negative for cough and shortness of breath.    Cardiovascular: Positive for palpitations. Negative for chest pain and leg swelling.   Gastrointestinal: Negative for abdominal pain, diarrhea and nausea.   Genitourinary: Negative for dysuria and frequency.   Musculoskeletal: Negative for back pain and joint pain.   Skin: Positive for rash (under her breast).   Neurological: Positive for dizziness, tingling (earlier today in bilateral hands) and weakness. Negative for focal weakness and headaches.   Psychiatric/Behavioral: Negative for substance abuse. The patient is not nervous/anxious and does not have insomnia.        Past Medical History   has a past medical history of Arthritis and Back pain.She states prior Atrial fibrillation and saw a cardiologist in the past too but doesn't remember his name.    Surgical History   has a past surgical history that includes tonsillectomy (1969).      Family History  family history includes Cancer in her mother. Father     Social History   reports that she has never smoked. She has never used smokeless tobacco. She reports that she drinks alcohol. She reports that she does not use drugs. and has children.  Works in front of a computer    Allergies  Allergies   Allergen Reactions   • Pcn [Penicillins] Hives       Medications  Prior to Admission Medications   Prescriptions Last Dose Informant Patient Reported? Taking?   Cholecalciferol (VITAMIN D3 PO) 2018 at Unknown time  Yes Yes   Sig: Take 1 Cap by mouth every day.   Methylsulfonylmethane (MSM) Powder 2018 at Unknown time  Yes Yes   Sig: Take 10 mL by mouth every day.   ascorbic acid (ASCORBIC ACID) 500 MG Tab 2018 at Unknown time  Yes Yes   Sig: Take 500 mg by mouth every day.   therapeutic multivitamin-minerals (THERAGRAN-M) Tab 2018 at Unknown time  Yes Yes   Sig: Take 1 Tab by mouth every day.   zinc sulfate (ZINCATE) 220 (50 Zn) MG Cap 2018 at Unknown time  Yes Yes   Sig: Take 220 mg by mouth every day.      Facility-Administered Medications: None       Physical Exam  Blood Pressure: 119/58   Temperature: 36.6 °C (97.8 °F)   Pulse: (!) 126   Respiration: 18   Pulse Oximetry: 98 %     Physical Exam   Constitutional: She is oriented to person, place, and time. She appears well-developed and well-nourished. No distress.   HENT:   Head: Normocephalic and atraumatic.   Nose: Nose normal.   Mouth/Throat: Oropharynx is clear and moist.   Eyes: Conjunctivae and EOM are normal. Right eye exhibits no discharge. Left eye exhibits no discharge. No scleral icterus.   Neck: Normal range of motion. No tracheal deviation present.   Cardiovascular: Normal heart sounds and intact distal pulses.  An irregularly irregular rhythm present. Tachycardia present.    No murmur heard.  Pulses:       Radial pulses are 2+ on the right side, and 2+ on the left side.        Dorsalis pedis  pulses are 2+ on the right side, and 2+ on the left side.   Pulmonary/Chest: Effort normal and breath sounds normal. No respiratory distress. She has no wheezes. She has no rales.   Abdominal: Soft. Bowel sounds are normal. She exhibits no distension. There is no tenderness.   Musculoskeletal: She exhibits no edema.   Lymphadenopathy:     She has no cervical adenopathy.   Neurological: She is alert and oriented to person, place, and time. No cranial nerve deficit.   Skin: Skin is warm. She is not diaphoretic.   Psychiatric: She has a normal mood and affect. Her behavior is normal. Thought content normal.   Vitals reviewed.      Laboratory:  Recent Labs      07/22/18   1038   WBC  6.2   RBC  4.97   HEMOGLOBIN  15.1   HEMATOCRIT  43.4   MCV  87.3   MCH  30.4   MCHC  34.8   RDW  39.6   PLATELETCT  276   MPV  9.5     Recent Labs      07/22/18   1038   SODIUM  142   POTASSIUM  3.4*   CHLORIDE  109   CO2  16*   GLUCOSE  155*   BUN  10   CREATININE  0.75   CALCIUM  9.9     Recent Labs      07/22/18   1038   ALTSGPT  27   ASTSGOT  22   ALKPHOSPHAT  77   TBILIRUBIN  0.5   GLUCOSE  155*     Recent Labs      07/22/18   1038  07/22/18   1755   APTT  29.7  107.9*   INR  1.05   --      Recent Labs      07/22/18   1038   BNPBTYPENAT  11         Lab Results   Component Value Date    TROPONINI <0.01 07/22/2018       Urinalysis:    Lab Results   Component Value Date    SPECGRAVITY 1.014 07/22/2018    GLUCOSEUR Negative 07/22/2018    KETONES 15 (A) 07/22/2018    NITRITE Negative 07/22/2018    WBCURINE 0-2 07/22/2018    RBCURINE 5-10 (A) 07/22/2018    BACTERIA Negative 07/22/2018    EPITHELCELL Negative 07/22/2018        Imaging:  DX-CHEST-PORTABLE (1 VIEW)   Final Result         1. No acute cardiopulmonary abnormalities are identified.      ECHOCARDIOGRAM COMP W/O CONT    (Results Pending)   Echocardiogram Comp W/O Cont    (Results Pending)         Assessment/Plan:  I anticipate this patient will require at least two midnights for  appropriate medical management, necessitating inpatient admission.    * Atrial fibrillation with rapid ventricular response (HCC)   Assessment & Plan    Diltiazem orderd  Digoxin given in ED  Heparin drip  Check Echocardiogram  Monitor on telemetry  Correct electrolytes and tsh.  Check Ddimer          Intertrigo   Assessment & Plan    Under bilateral breast  Antifungal cream initiated        Hypokalemia   Assessment & Plan    Check Mg and aim for level >2.  Supplement  Add KCl orally and recheck am BMP              VTE prophylaxis: heparin

## 2018-07-23 NOTE — CARE PLAN
Problem: Infection  Goal: Will remain free from infection  Outcome: PROGRESSING AS EXPECTED  Discussed proper hand hygiene with the patient

## 2018-07-23 NOTE — ASSESSMENT & PLAN NOTE
Diltiazem orderd  Digoxin given in ED  Heparin drip  Check Echocardiogram  Monitor on telemetry  Correct electrolytes and tsh.  Check Ddimer

## 2018-07-23 NOTE — CARE PLAN
Problem: Safety  Goal: Will remain free from injury  Outcome: PROGRESSING AS EXPECTED  Discussed safety protocol with the patient

## 2018-07-24 RX ORDER — AMIODARONE HYDROCHLORIDE 200 MG/1
200 TABLET ORAL 2 TIMES DAILY
Qty: 60 TAB | Refills: 0 | Status: SHIPPED | OUTPATIENT
Start: 2018-07-24 | End: 2018-08-09 | Stop reason: SDUPTHER

## 2018-07-24 RX ORDER — WARFARIN SODIUM 5 MG/1
5 TABLET ORAL DAILY
Qty: 30 TAB | Refills: 0 | Status: SHIPPED | OUTPATIENT
Start: 2018-07-24 | End: 2018-10-19 | Stop reason: SDUPTHER

## 2018-07-24 NOTE — DISCHARGE INSTRUCTIONS
Discharge Instructions    Discharged to home by car with relative. Discharged via wheelchair, hospital escort: Yes.  Special equipment needed: Not Applicable    Be sure to schedule a follow-up appointment with your primary care doctor or any specialists as instructed.     Discharge Plan:   Diet Plan: Discussed  Activity Level: Discussed  Confirmed Follow up Appointment: Appointment Scheduled  Confirmed Symptoms Management: Discussed  Medication Reconciliation Updated: Yes  Influenza Vaccine Indication: Patient Refuses    I understand that a diet low in cholesterol, fat, and sodium is recommended for good health. Unless I have been given specific instructions below for another diet, I accept this instruction as my diet prescription.   Other diet: heart healthy    Special Instructions: Atrial Fibrillation  Atrial fibrillation is a type of irregular or rapid heartbeat (arrhythmia). In atrial fibrillation, the heart quivers continuously in a chaotic pattern. This occurs when parts of the heart receive disorganized signals that make the heart unable to pump blood normally. This can increase the risk for stroke, heart failure, and other heart-related conditions. There are different types of atrial fibrillation, including:  · Paroxysmal atrial fibrillation. This type starts suddenly, and it usually stops on its own shortly after it starts.  · Persistent atrial fibrillation. This type often lasts longer than a week. It may stop on its own or with treatment.  · Long-lasting persistent atrial fibrillation. This type lasts longer than 12 months.  · Permanent atrial fibrillation. This type does not go away.  Talk with your health care provider to learn about the type of atrial fibrillation that you have.  What are the causes?  This condition is caused by some heart-related conditions or procedures, including:  · A heart attack.  · Coronary artery disease.  · Heart failure.  · Heart valve conditions.  · High blood  pressure.  · Inflammation of the sac that surrounds the heart (pericarditis).  · Heart surgery.  · Certain heart rhythm disorders, such as Medley-Parkinson-White syndrome.  Other causes include:  · Pneumonia.  · Obstructive sleep apnea.  · Blockage of an artery in the lungs (pulmonary embolism, or PE).  · Lung cancer.  · Chronic lung disease.  · Thyroid problems, especially if the thyroid is overactive (hyperthyroidism).  · Caffeine.  · Excessive alcohol use or illegal drug use.  · Use of some medicines, including certain decongestants and diet pills.  Sometimes, the cause cannot be found.  What increases the risk?  This condition is more likely to develop in:  · People who are older in age.  · People who smoke.  · People who have diabetes mellitus.  · People who are overweight (obese).  · Athletes who exercise vigorously.  What are the signs or symptoms?  Symptoms of this condition include:  · A feeling that your heart is beating rapidly or irregularly.  · A feeling of discomfort or pain in your chest.  · Shortness of breath.  · Sudden light-headedness or weakness.  · Getting tired easily during exercise.  In some cases, there are no symptoms.  How is this diagnosed?  Your health care provider may be able to detect atrial fibrillation when taking your pulse. If detected, this condition may be diagnosed with:  · An electrocardiogram (ECG).  · A Holter monitor test that records your heartbeat patterns over a 24-hour period.  · Transthoracic echocardiogram (TTE) to evaluate how blood flows through your heart.  · Transesophageal echocardiogram (EDGARDO) to view more detailed images of your heart.  · A stress test.  · Imaging tests, such as a CT scan or chest X-ray.  · Blood tests.  How is this treated?  The main goals of treatment are to prevent blood clots from forming and to keep your heart beating at a normal rate and rhythm. The type of treatment that you receive depends on many factors, such as your underlying medical  conditions and how you feel when you are experiencing atrial fibrillation.  This condition may be treated with:  · Medicine to slow down the heart rate, bring the heart’s rhythm back to normal, or prevent clots from forming.  · Electrical cardioversion. This is a procedure that resets your heart’s rhythm by delivering a controlled, low-energy shock to the heart through your skin.  · Different types of ablation, such as catheter ablation, catheter ablation with pacemaker, or surgical ablation. These procedures destroy the heart tissues that send abnormal signals. When the pacemaker is used, it is placed under your skin to help your heart beat in a regular rhythm.  Follow these instructions at home:  · Take over-the counter and prescription medicines only as told by your health care provider.  · If your health care provider prescribed a blood-thinning medicine (anticoagulant), take it exactly as told. Taking too much blood-thinning medicine can cause bleeding. If you do not take enough blood-thinning medicine, you will not have the protection that you need against stroke and other problems.  · Do not use tobacco products, including cigarettes, chewing tobacco, and e-cigarettes. If you need help quitting, ask your health care provider.  · If you have obstructive sleep apnea, manage your condition as told by your health care provider.  · Do not drink alcohol.  · Do not drink beverages that contain caffeine, such as coffee, soda, and tea.  · Maintain a healthy weight. Do not use diet pills unless your health care provider approves. Diet pills may make heart problems worse.  · Follow diet instructions as told by your health care provider.  · Exercise regularly as told by your health care provider.  · Keep all follow-up visits as told by your health care provider. This is important.  How is this prevented?  · Avoid drinking beverages that contain caffeine or alcohol.  · Avoid certain medicines, especially medicines that  are used for breathing problems.  · Avoid certain herbs and herbal medicines, such as those that contain ephedra or ginseng.  · Do not use illegal drugs, such as cocaine and amphetamines.  · Do not smoke.  · Manage your high blood pressure.  Contact a health care provider if:  · You notice a change in the rate, rhythm, or strength of your heartbeat.  · You are taking an anticoagulant and you notice increased bruising.  · You tire more easily when you exercise or exert yourself.  Get help right away if:  · You have chest pain, abdominal pain, sweating, or weakness.  · You feel nauseous.  · You notice blood in your vomit, bowel movement, or urine.  · You have shortness of breath.  · You suddenly have swollen feet and ankles.  · You feel dizzy.  · You have sudden weakness or numbness of the face, arm, or leg, especially on one side of the body.  · You have trouble speaking, trouble understanding, or both (aphasia).  · Your face or your eyelid droops on one side.  These symptoms may represent a serious problem that is an emergency. Do not wait to see if the symptoms will go away. Get medical help right away. Call your local emergency services (911 in the [place] Do not drive yourself to the hospital.   This information is not intended to replace advice given to you by your health care provider. Make sure you discuss any questions you have with your health care provider.  Document Released: 12/18/2006 Document Revised: 04/26/2017 Document Reviewed: 04/13/2016  [employer] Patient Education © 2017 [employer]      · Is patient discharged on Warfarin / Coumadin?   Yes    You are receiving the drug warfarin. Please understand the importance of monitoring warfarin with scheduled PT/INR blood draws.  Follow-up with the [facility] in one week for INR lab..    IMPORTANT: HOW TO USE THIS INFORMATION:  This is a summary and does NOT have all possible information about this product. This information does not assure  "that this product is safe, effective, or appropriate for you. This information is not individual medical advice and does not substitute for the advice of your health care professional. Always ask your health care professional for complete information about this product and your specific health needs.      WARFARIN - ORAL (WARF-uh-rin)      COMMON BRAND NAME(S): Coumadin      WARNING:  Warfarin can cause very serious (possibly fatal) bleeding. This is more likely to occur when you first start taking this medication or if you take too much warfarin. To decrease your risk for bleeding, your doctor or other health care provider will monitor you closely and check your lab results (INR test) to make sure you are not taking too much warfarin. Keep all medical and laboratory appointments. Tell your doctor right away if you notice any signs of serious bleeding. See also Side Effects section.      USES:  This medication is used to treat blood clots (such as in deep vein thrombosis-DVT or pulmonary embolus-PE) and/or to prevent new clots from forming in your body. Preventing harmful blood clots helps to reduce the risk of a stroke or heart attack. Conditions that increase your risk of developing blood clots include a certain type of irregular heart rhythm (atrial fibrillation), heart valve replacement, recent heart attack, and certain surgeries (such as hip/knee replacement). Warfarin is commonly called a \"blood thinner,\" but the more correct term is \"anticoagulant.\" It helps to keep blood flowing smoothly in your body by decreasing the amount of certain substances (clotting proteins) in your blood.      HOW TO USE:  Read the Medication Guide provided by your pharmacist before you start taking warfarin and each time you get a refill. If you have any questions, ask your doctor or pharmacist. Take this medication by mouth with or without food as directed by your doctor or other health care professional, usually once a day. It is " very important to take it exactly as directed. Do not increase the dose, take it more frequently, or stop using it unless directed by your doctor. Dosage is based on your medical condition, laboratory tests (such as INR), and response to treatment. Your doctor or other health care provider will monitor you closely while you are taking this medication to determine the right dose for you. Use this medication regularly to get the most benefit from it. To help you remember, take it at the same time each day. It is important to eat a balanced, consistent diet while taking warfarin. Some foods can affect how warfarin works in your body and may affect your treatment and dose. Avoid sudden large increases or decreases in your intake of foods high in vitamin K (such as broccoli, cauliflower, cabbage, brussels sprouts, kale, spinach, and other green leafy vegetables, liver, green tea, certain vitamin supplements). If you are trying to lose weight, check with your doctor before you try to go on a diet. Cranberry products may also affect how your warfarin works. Limit the amount of cranberry juice (16 ounces/480 milliliters a day) or other cranberry products you may drink or eat.      SIDE EFFECTS:  Nausea, loss of appetite, or stomach/abdominal pain may occur. If any of these effects persist or worsen, tell your doctor or pharmacist promptly. Remember that your doctor has prescribed this medication because he or she has judged that the benefit to you is greater than the risk of side effects. Many people using this medication do not have serious side effects. This medication can cause serious bleeding if it affects your blood clotting proteins too much (shown by unusually high INR lab results). Even if your doctor stops your medication, this risk of bleeding can continue for up to a week. Tell your doctor right away if you have any signs of serious bleeding, including: unusual pain/swelling/discomfort, unusual/easy bruising,  prolonged bleeding from cuts or gums, persistent/frequent nosebleeds, unusually heavy/prolonged menstrual flow, pink/dark urine, coughing up blood, vomit that is bloody or looks like coffee grounds, severe headache, dizziness/fainting, unusual or persistent tiredness/weakness, bloody/black/tarry stools, chest pain, shortness of breath, difficulty swallowing. Tell your doctor right away if any of these unlikely but serious side effects occur: persistent nausea/vomiting, severe stomach/abdominal pain, yellowing eyes/skin. This drug rarely has caused very serious (possibly fatal) problems if its effects lead to small blood clots (usually at the beginning of treatment). This can lead to severe skin/tissue damage that may require surgery or amputation if left untreated. Patients with certain blood conditions (protein C or S deficiency) may be at greater risk. Get medical help right away if any of these rare but serious side effects occur: painful/red/purplish patches on the skin (such as on the toe, breast, abdomen), change in the amount of urine, vision changes, confusion, slurred speech, weakness on one side of the body. A very serious allergic reaction to this drug is rare. However, get medical help right away if you notice any symptoms of a serious allergic reaction, including: rash, itching/swelling (especially of the face/tongue/throat), severe dizziness, trouble breathing. This is not a complete list of possible side effects. If you notice other effects not listed above, contact your doctor or pharmacist. In the [place] - Call your doctor for medical advice about side effects. You may report side effects to FDA at 2-922-HDO-5739. In [place] - Call your doctor for medical advice about side effects. You may report side effects to [facility] [place] at [phone].      PRECAUTIONS:  Before taking warfarin, tell your doctor or pharmacist if you are allergic to it; or if you have any other allergies. This product may contain  inactive ingredients, which can cause allergic reactions or other problems. Talk to your pharmacist for more details. Before using this medication, tell your doctor or pharmacist your medical history, especially of: blood disorders (such as anemia, hemophilia), bleeding problems (such as bleeding of the stomach/intestines, bleeding in the brain), blood vessel disorders (such as aneurysms), recent major injury/surgery, liver disease, alcohol use, mental/mood disorders (including memory problems), frequent falls/injuries. It is important that all your doctors and dentists know that you take warfarin. Before having surgery or any medical/dental procedures, tell your doctor or dentist that you are taking this medication and about all the products you use (including prescription drugs, nonprescription drugs, and herbal products). Avoid getting injections into the muscles. If you must have an injection into a muscle (for example, a flu shot), it should be given in the arm. This way, it will be easier to check for bleeding and/or apply pressure bandages. This medication may cause stomach bleeding. Daily use of alcohol while using this medicine will increase your risk for stomach bleeding and may also affect how this medication works. Limit or avoid alcoholic beverages. If you have not been eating well, if you have an illness or infection that causes fever, vomiting, or diarrhea for more than 2 days, or if you start using any antibiotic medications, contact your doctor or pharmacist immediately because these conditions can affect how warfarin works. This medication can cause heavy bleeding. To lower the chance of getting cut, bruised, or injured, use great caution with sharp objects like safety razors and nail cutters. Use an electric razor when shaving and a soft toothbrush when brushing your teeth. Avoid activities such as contact sports. If you fall or injure yourself, especially if you hit your head, call your doctor  "immediately. Your doctor may need to check you. The Food & Drug Administration has stated that generic warfarin products are interchangeable. However, consult your doctor or pharmacist before switching warfarin products. Be careful not to take more than one medication that contains warfarin unless specifically directed by the doctor or health care provider who is monitoring your warfarin treatment. Older adults may be at greater risk for bleeding while using this drug. This medication is not recommended for use during pregnancy because of serious (possibly fatal) harm to an unborn baby. Discuss the use of reliable forms of birth control with your doctor. If you become pregnant or think you may be pregnant, tell your doctor immediately. If you are planning pregnancy, discuss a plan for managing your condition with your doctor before you become pregnant. Your doctor may switch the type of medication you use during pregnancy. Very small amounts of this medication may pass into breast milk but is unlikely to harm a nursing infant. Consult your doctor before breast-feeding.      DRUG INTERACTIONS:  Drug interactions may change how your medications work or increase your risk for serious side effects. This document does not contain all possible drug interactions. Keep a list of all the products you use (including prescription/nonprescription drugs and herbal products) and share it with your doctor and pharmacist. Do not start, stop, or change the dosage of any medicines without your doctor's approval. Warfarin interacts with many prescription, nonprescription, vitamin, and herbal products. This includes medications that are applied to the skin or inside the vagina or rectum. The interactions with warfarin usually result in an increase or decrease in the \"blood-thinning\" (anticoagulant) effect. Your doctor or other health care professional should closely monitor you to prevent serious bleeding or clotting problems. While " taking warfarin, it is very important to tell your doctor or pharmacist of any changes in medications, vitamins, or herbal products that you are taking. Some products that may interact with this drug include: capecitabine, imatinib, mifepristone. Aspirin, aspirin-like drugs (salicylates), and nonsteroidal anti-inflammatory drugs (NSAIDs such as ibuprofen, naproxen, celecoxib) may have effects similar to warfarin. These drugs may increase the risk of bleeding problems if taken during treatment with warfarin. Carefully check all prescription/nonprescription product labels (including drugs applied to the skin such as pain-relieving creams) since the products may contain NSAIDs or salicylates. Talk to your doctor about using a different medication (such as acetaminophen) to treat pain/fever. Low-dose aspirin and related drugs (such as clopidogrel, ticlopidine) should be continued if prescribed by your doctor for specific medical reasons such as heart attack or stroke prevention. Consult your doctor or pharmacist for more details. Many herbal products interact with warfarin. Tell your doctor before taking any herbal products, especially bromelains, coenzyme Q10, cranberry, danshen, dong quai, fenugreek, garlic, ginkgo biloba, ginseng, and [place]'s wort, among others. This medication may interfere with a certain laboratory test to measure theophylline levels, possibly causing false test results. Make sure laboratory personnel and all your doctors know you use this drug.      OVERDOSE:  If overdose is suspected, contact a poison control center or emergency room immediately. [place] residents can call the [place] National Poison Hotline at 1-472.129.5850. [place] residents can call a provincial poison control center. Symptoms of overdose may include: bloody/black/tarry stools, pink/dark urine, unusual/prolonged bleeding.      NOTES:  Do not share this medication with others. Laboratory and/or medical tests (such as INR, complete  blood count) must be performed periodically to monitor your progress or check for side effects. Consult your doctor for more details.      MISSED DOSE:  For the best possible benefit, do not miss any doses. If you do miss a dose and remember on the same day, take it as soon as you remember. If you remember on the next day, skip the missed dose and resume your usual dosing schedule. Do not double the dose to catch up because this could increase your risk for bleeding. Keep a record of missed doses to give to your doctor or pharmacist. Contact your doctor or pharmacist if you miss 2 or more doses in a row.      STORAGE:  Store at room temperature away from light and moisture. Do not store in the bathroom. Keep all medications away from children and pets. Do not flush medications down the toilet or pour them into a drain unless instructed to do so. Properly discard this product when it is  or no longer needed. Consult your pharmacist or local waste disposal company for more details about how to safely discard your product.      MEDICAL ALERT:  Your condition and medication can cause complications in a medical emergency. For information about enrolling in MedicAlert, call 1-680.269.7978 ([place]) or 1-542.581.2517 ([place]).      Information last revised 2010 Copyright(c)  [employer]         Amiodarone tablets  What is this medicine?  AMIODARONE (a CLEOPATRA oh da seun) is an antiarrhythmic drug. It helps make your heart beat regularly. Because of the side effects caused by this medicine, it is only used when other medicines have not worked. It is usually used for heartbeat problems that may be life threatening.  This medicine may be used for other purposes; ask your health care provider or pharmacist if you have questions.  COMMON BRAND NAME(S): Cordarone, Pacerone  What should I tell my health care provider before I take this medicine?  They need to know if you have any of these conditions:  -liver  disease  -lung disease  -other heart problems  -thyroid disease  -an unusual or allergic reaction to amiodarone, iodine, other medicines, foods, dyes, or preservatives  -pregnant or trying to get pregnant  -breast-feeding  How should I use this medicine?  Take this medicine by mouth with a glass of water. Follow the directions on the prescription label. You can take this medicine with or without food. However, you should always take it the same way each time. Take your doses at regular intervals. Do not take your medicine more often than directed. Do not stop taking except on the advice of your doctor or health care professional.  A special MedGuide will be given to you by the pharmacist with each prescription and refill. Be sure to read this information carefully each time.  Talk to your pediatrician regarding the use of this medicine in children. Special care may be needed.  Overdosage: If you think you have taken too much of this medicine contact a poison control center or emergency room at once.  NOTE: This medicine is only for you. Do not share this medicine with others.  What if I miss a dose?  If you miss a dose, take it as soon as you can. If it is almost time for your next dose, take only that dose. Do not take double or extra doses.  What may interact with this medicine?  Do not take this medicine with any of the following medications:  -abarelix  -apomorphine  -arsenic trioxide  -certain antibiotics like erythromycin, gemifloxacin, levofloxacin, pentamidine  -certain medicines for depression like amoxapine, tricyclic antidepressants  -certain medicines for fungal infections like fluconazole, itraconazole, ketoconazole, posaconazole, voriconazole  -certain medicines for irregular heart beat like disopyramide, dofetilide, dronedarone, ibutilide, propafenone, sotalol  -certain medicines for malaria like chloroquine,  halofantrine  -cisapride  -droperidol  -haloperidol  -hawthorn  -maprotiline  -methadone  -phenothiazines like chlorpromazine, mesoridazine, thioridazine  -pimozide  -ranolazine  -red yeast rice  -vardenafil  -ziprasidone  This medicine may also interact with the following medications:  -antiviral medicines for HIV or AIDS  -certain medicines for blood pressure, heart disease, irregular heart beat  -certain medicines for cholesterol like atorvastatin, cerivastatin, lovastatin, simvastatin  -certain medicines for hepatitis C like sofosbuvir and ledipasvir; sofosbuvir  -certain medicines for seizures like phenytoin  -certain medicines for thyroid problems  -certain medicines that treat or prevent blood clots like warfarin  -cholestyramine  -cimetidine  -clopidogrel  -cyclosporine  -dextromethorphan  -diuretics  -fentanyl  -general anesthetics  -grapefruit juice  -lidocaine  -loratadine  -methotrexate  -other medicines that prolong the QT interval (cause an abnormal heart rhythm)  -procainamide  -quinidine  -rifabutin, rifampin, or rifapentine  -Edwige's Wort  -trazodone  This list may not describe all possible interactions. Give your health care provider a list of all the medicines, herbs, non-prescription drugs, or dietary supplements you use. Also tell them if you smoke, drink alcohol, or use illegal drugs. Some items may interact with your medicine.  What should I watch for while using this medicine?  Your condition will be monitored closely when you first begin therapy. Often, this drug is first started in a hospital or other monitored health care setting. Once you are on maintenance therapy, visit your doctor or health care professional for regular checks on your progress. Because your condition and use of this medicine carry some risk, it is a good idea to carry an identification card, necklace or bracelet with details of your condition, medications, and doctor or health care professional.  You may get drowsy  or dizzy. Do not drive, use machinery, or do anything that needs mental alertness until you know how this medicine affects you. Do not stand or sit up quickly, especially if you are an older patient. This reduces the risk of dizzy or fainting spells.  This medicine can make you more sensitive to the sun. Keep out of the sun. If you cannot avoid being in the sun, wear protective clothing and use sunscreen. Do not use sun lamps or tanning beds/booths.  You should have regular eye exams before and during treatment. Call your doctor if you have blurred vision, see halos, or your eyes become sensitive to light. Your eyes may get dry. It may be helpful to use a lubricating eye solution or artificial tears solution.  If you are going to have surgery or a procedure that requires contrast dyes, tell your doctor or health care professional that you are taking this medicine.  What side effects may I notice from receiving this medicine?  Side effects that you should report to your doctor or health care professional as soon as possible:  -allergic reactions like skin rash, itching or hives, swelling of the face, lips, or tongue  -blue-gray coloring of the skin  -blurred vision, seeing blue green halos, increased sensitivity of the eyes to light  -breathing problems  -chest pain  -dark urine  -fast, irregular heartbeat  -feeling faint or light-headed  -intolerance to heat or cold  -nausea or vomiting  -pain and swelling of the scrotum  -pain, tingling, numbness in feet, hands  -redness, blistering, peeling or loosening of the skin, including inside the mouth  -spitting up blood  -stomach pain  -sweating  -unusual or uncontrolled movements of body  -unusually weak or tired  -weight gain or loss  -yellowing of the eyes or skin  Side effects that usually do not require medical attention (report to your doctor or health care professional if they continue or are bothersome):  -change in sex drive or  performance  -constipation  -dizziness  -headache  -loss of appetite  -trouble sleeping  This list may not describe all possible side effects. Call your doctor for medical advice about side effects. You may report side effects to FDA at 9-551-CMS-1650.  Where should I keep my medicine?  Keep out of the reach of children.  Store at room temperature between 20 and 25 degrees C (68 and 77 degrees F). Protect from light. Keep container tightly closed. Throw away any unused medicine after the expiration date.  NOTE: This sheet is a summary. It may not cover all possible information. If you have questions about this medicine, talk to your doctor, pharmacist, or health care provider.  © 2018 Elsevier/Gold Standard (2015-03-23 19:48:11)      Depression / Suicide Risk    As you are discharged from this [facility] facility, it is important to learn how to keep safe from harming yourself.    Recognize the warning signs:  · Abrupt changes in personality, positive or negative- including increase in energy   · Giving away possessions  · Change in eating patterns- significant weight changes-  positive or negative  · Change in sleeping patterns- unable to sleep or sleeping all the time   · Unwillingness or inability to communicate  · Depression  · Unusual sadness, discouragement and loneliness  · Talk of wanting to die  · Neglect of personal appearance   · Rebelliousness- reckless behavior  · Withdrawal from people/activities they love  · Confusion- inability to concentrate     If you or a loved one observes any of these behaviors or has concerns about self-harm, here's what you can do:  · Talk about it- your feelings and reasons for harming yourself  · Remove any means that you might use to hurt yourself (examples: pills, rope, extension cords, firearm)  · Get professional help from the community (Mental Health, Substance Abuse, psychological counseling)  · Do not be alone:Call your Safe Contact- someone whom you trust who will  be there for you.  · Call your local CRISIS HOTLINE 046-3104 or 354-264-7195  · Call your local Children's Mobile Crisis Response Team [place] (833) 472-2098 or www.[facility] [facility]  · Call the toll free National Suicide Prevention Hotlines   · National Suicide Prevention Lifeline 080-447-ILEE (1376)  · National [employer] Line Network 800-SUICIDE (736-7598)

## 2018-07-24 NOTE — PROGRESS NOTES
Pt discharged via wheelchair with  and family  to car to home. Pt was AOx4 at time of discharge. PIVs removed, tele box taken off. RN went over discharge instructions thoroughly including medications, healthy diet, activity, signs and symptoms of infection, heart attack and stroke. RN reviewed when to call MD and when to call 911. Paperwork signed and all questions answered. Follow up appointments set and pt verbally understands that following through with the appointments is necessary, and to call Coumadin clinic to schedule appt within 1 week. All contact information given to patient. All personal belongings with patient. Prescriptions escribed to pt pharmacy and a copy of AVS discharge instructions given to patient.

## 2018-07-24 NOTE — PROGRESS NOTES
Monitor summary:   afib , converted to SR at 1030am rate 64-81, rare PVC, rare couplets.    .14/.08/.38

## 2018-07-24 NOTE — PROGRESS NOTES
Pt has been sitting up in bed, and edge of bed this afternoon, alert, denies pain, still in SR.  at bedside. RN provided education on Afib disease process, and medications. Hourly rounding in place, call light and items within reach.

## 2018-07-24 NOTE — PROGRESS NOTES
Pt has been awake, relaxed and alert in bed this morning, walking to and from restroom easily, no assistance needed, she has not experienced any lightheadedness, dizziness or shortness of breath when walking to restroom. She denies pain, call light and items within reach. She is still in Sinus rhythm and pt states she feels better.  Education was done on afib, connection with risk for stroke, medications, used teach back,  at bedside.   Hourly rounding in place.

## 2018-07-25 ENCOUNTER — TELEPHONE (OUTPATIENT)
Dept: VASCULAR LAB | Facility: MEDICAL CENTER | Age: 53
End: 2018-07-25

## 2018-07-25 DIAGNOSIS — I48.91 ATRIAL FIBRILLATION WITH RAPID VENTRICULAR RESPONSE (HCC): ICD-10-CM

## 2018-07-25 NOTE — TELEPHONE ENCOUNTER
Spoke with pt, she is self pay, so at this time she declined to come in for visit due to cost.   Sent SO to [facility], pt to go tomorrow.     Piper Pedroza, PharmD

## 2018-07-26 LAB
INR PPP: 1.1 (ref 0.8–1.2)
PROTHROMBIN TIME: 10.9 SEC (ref 9.1–12)

## 2018-07-27 ENCOUNTER — ANTICOAGULATION MONITORING (OUTPATIENT)
Dept: VASCULAR LAB | Facility: MEDICAL CENTER | Age: 53
End: 2018-07-27

## 2018-07-27 ENCOUNTER — APPOINTMENT (OUTPATIENT)
Dept: VASCULAR LAB | Facility: MEDICAL CENTER | Age: 53
End: 2018-07-27

## 2018-07-27 DIAGNOSIS — I48.91 ATRIAL FIBRILLATION WITH RAPID VENTRICULAR RESPONSE (HCC): ICD-10-CM

## 2018-07-28 NOTE — PROGRESS NOTES
Anticoagulation Summary  As of 7/27/2018    INR goal:   2.0-3.0   TTR:   --   Today's INR:   1.1! (7/26/2018)   Warfarin maintenance plan:   5 mg (5 mg x 1) every day   Weekly warfarin total:   35 mg   Plan last modified:   Shashi Lawler, PharmD (7/27/2018)   Next INR check:   7/31/2018   Target end date:   Indefinite    Indications    Atrial fibrillation with rapid ventricular response (HCC) [I48.91]             Anticoagulation Episode Summary     INR check location:       Preferred lab:       Send INR reminders to:       Comments:         Anticoagulation Care Providers     Provider Role Specialty Phone number    Ruel Duran M.D. Referring Internal Medicine [phone]    [facility] Responsible  [phone]    Shashi Lawler, PharmD Responsible          Anticoagulation Patient Findings    HPI:    Pt is new to warfarin and new to [facility].  Discussed indication for warfarin therapy and INR goal range. Explained our services, hours of operation, warfarin therapy, potential SE, potential DI.. Discussed diet at length, with an emphasis on foods rich in vitamin K.  Discussed monitoring parameters, such as blood in urine, blood in stool, discussed what to do if a dose is missed, or suspected as missed.  Emphasized importance of compliance including follow up. Discussed lifestyle choices of ETOH & smoking and its impact on therapy.  Added [facility] to care team    Recently hospitalized for tachycardia and was found to be in atrial fibrillation with RVR.  [id number] = 1 (female)  Patient denies hx of CVA/TIA or VTE.  Has never taken anticoagulation.  She was initiated on warfarin by hospital team at discharge.    Assessment:    Initial INR subtherapeutic at 1.1.  Patient has been on warfarin for three days.    Plan:    Will have patient bolus with 10mg, then 7.5mg, then resume current warfarin regimen.  Follow up in 3 days, to reduce risk of adverse events related to this  high risk medication,  Warfarin.    Shashi Lawler, PharmD    CC Dr Michael Bloch

## 2018-07-30 ENCOUNTER — TELEPHONE (OUTPATIENT)
Dept: VASCULAR LAB | Facility: MEDICAL CENTER | Age: 53
End: 2018-07-30

## 2018-07-31 ENCOUNTER — ANTICOAGULATION MONITORING (OUTPATIENT)
Dept: VASCULAR LAB | Facility: MEDICAL CENTER | Age: 53
End: 2018-07-31

## 2018-07-31 DIAGNOSIS — I48.91 ATRIAL FIBRILLATION WITH RAPID VENTRICULAR RESPONSE (HCC): ICD-10-CM

## 2018-07-31 LAB
INR PPP: 1.7 (ref 0.8–1.2)
PROTHROMBIN TIME: 16.4 SEC (ref 9.1–12)

## 2018-07-31 NOTE — TELEPHONE ENCOUNTER
Initial anticoag note and most recent d/c summary reviewed.  Patient with afib and chads vasc = 1 (female)    Pending further recommendations, we will continue with indefinite anticoagulation with warfarin as clearly directed by cards in their notes in hospital    Will defer all management of rhythm, rate, and other cv issues, aside from anticoagulation, to cards    Michael Bloch, MD  Anticoagulation Clinic    Cc: CORONA Oneill

## 2018-08-01 NOTE — PROGRESS NOTES
Anticoagulation Summary  As of 7/31/2018    INR goal:   2.0-3.0   TTR:   --   Today's INR:   1.7!   Warfarin maintenance plan:   7.5 mg (5 mg x 1.5) every day   Weekly warfarin total:   52.5 mg   Plan last modified:   Jordon Stauffer, Elizabeth (7/31/2018)   Next INR check:   8/3/2018   Target end date:   Indefinite    Indications    Atrial fibrillation with rapid ventricular response (HCC) [I48.91]             Anticoagulation Episode Summary     INR check location:       Preferred lab:       Send INR reminders to:       Comments:         Anticoagulation Care Providers     Provider Role Specialty Phone number    Ruel Duran M.D. Referring Internal Medicine [phone]    [facility] Responsible  [phone]    Radha BansalD Responsible          Anticoagulation Patient Findings      Spoke to patient on the phone.   INR  sub-therapeutic.   Denies signs/symptoms of bleeding and/or thrombosis.   Denies changes to diet or medications.   Follow up appointment in 3 days     Increase weekly warfarin dose as noted      Jordon Stauffer, PharmD

## 2018-08-03 ENCOUNTER — ANTICOAGULATION MONITORING (OUTPATIENT)
Dept: VASCULAR LAB | Facility: MEDICAL CENTER | Age: 53
End: 2018-08-03

## 2018-08-03 DIAGNOSIS — I48.91 ATRIAL FIBRILLATION WITH RAPID VENTRICULAR RESPONSE (HCC): ICD-10-CM

## 2018-08-03 LAB
INR PPP: 2.2 (ref 0.8–1.2)
INR PPP: 2.2 (ref 2–3.5)
PROTHROMBIN TIME: 21.4 SEC (ref 9.1–12)

## 2018-08-04 NOTE — PROGRESS NOTES
Anticoagulation Summary  As of 8/3/2018    INR goal:   2.0-3.0   TTR:   --   Today's INR:   2.2   Warfarin maintenance plan:   7.5 mg (5 mg x 1.5) every day   Weekly warfarin total:   52.5 mg   Plan last modified:   Jordon Stauffer, PharmD (7/31/2018)   Next INR check:   8/8/2018   Target end date:   Indefinite    Indications    Atrial fibrillation with rapid ventricular response (HCC) [I48.91]             Anticoagulation Episode Summary     INR check location:       Preferred lab:       Send INR reminders to:       Comments:         Anticoagulation Care Providers     Provider Role Specialty Phone number    Ruel Duran M.D. Referring Internal Medicine [phone]    [facility] Responsible  [phone]    Shashi Lalwer, PharmD Responsible          Anticoagulation Patient Findings  Patient Findings     Negatives:   Signs/symptoms of thrombosis, Signs/symptoms of bleeding, Laboratory test error suspected, Change in health, Change in alcohol use, Change in activity, Upcoming invasive procedure, Emergency department visit, Upcoming dental procedure, Missed doses, Extra doses, Change in medications, Change in diet/appetite, Hospital admission, Bruising, Other complaints        Spoke with patient today regarding therapeutic INR of 2.2.  Patient denies any signs/symptoms of bruising or bleeding or any changes in diet and medications.  Instructed patient to call clinic with any questions or concerns.  Pt is to continue with current warfarin dosing regimen.  Follow up in 5 days, to reduce risk of adverse events related to this high risk medication,  Warfarin.    Shashi Lawler, RadhaD

## 2018-08-08 ENCOUNTER — ANTICOAGULATION MONITORING (OUTPATIENT)
Dept: VASCULAR LAB | Facility: MEDICAL CENTER | Age: 53
End: 2018-08-08

## 2018-08-08 DIAGNOSIS — I48.91 ATRIAL FIBRILLATION WITH RAPID VENTRICULAR RESPONSE (HCC): ICD-10-CM

## 2018-08-08 LAB
INR PPP: 3 (ref 0.8–1.2)
PROTHROMBIN TIME: 28.7 SEC (ref 9.1–12)

## 2018-08-08 NOTE — PROGRESS NOTES
Anticoagulation Summary  As of 8/8/2018    INR goal:   2.0-3.0   TTR:   100.0 % (2 d)   Today's INR:   3.0   Warfarin maintenance plan:   7.5 mg (5 mg x 1.5) every day   Weekly warfarin total:   52.5 mg   Plan last modified:   Jordon Stauffer, PharmD (7/31/2018)   Next INR check:   8/15/2018   Target end date:   Indefinite    Indications    Atrial fibrillation with rapid ventricular response (HCC) [I48.91]             Anticoagulation Episode Summary     INR check location:       Preferred lab:       Send INR reminders to:       Comments:         Anticoagulation Care Providers     Provider Role Specialty Phone number    Ruel Duran M.D. Referring Internal Medicine [phone]    [facility] Responsible  [phone]    Shashi Lawler, PharmD Responsible          Anticoagulation Patient Findings      Left voicemail message to report a therapeutic INR of 3.0.    Pt to continue with current warfarin dosing regimen. Requested pt contact the clinic for any s/s of unusual bleeding, bruising, clotting or any changes to diet or medication.    FU INR in 1 week(s).    Piper Pedroza, PharmD

## 2018-08-09 DIAGNOSIS — I48.91 ATRIAL FIBRILLATION WITH RAPID VENTRICULAR RESPONSE (HCC): Primary | ICD-10-CM

## 2018-08-09 RX ORDER — AMIODARONE HYDROCHLORIDE 200 MG/1
200 TABLET ORAL DAILY
Qty: 30 TAB | Refills: 11 | Status: SHIPPED | OUTPATIENT
Start: 2018-08-09 | End: 2019-08-14 | Stop reason: SDUPTHER

## 2018-08-09 NOTE — TELEPHONE ENCOUNTER
Per DB:  JODY Cardoso, Med Ass't   Caller: Unspecified (Today, 12:52 PM)             Please inform patient she was supposed to be discharged on low-dose amiodarone so I have changed the prescription to 1 tablet daily.   Thank you, Flakita-        Left a message with patient

## 2018-08-16 ENCOUNTER — ANTICOAGULATION MONITORING (OUTPATIENT)
Dept: VASCULAR LAB | Facility: MEDICAL CENTER | Age: 53
End: 2018-08-16

## 2018-08-16 DIAGNOSIS — I48.91 ATRIAL FIBRILLATION WITH RAPID VENTRICULAR RESPONSE (HCC): ICD-10-CM

## 2018-08-16 LAB
INR PPP: 3.4 (ref 0.8–1.2)
PROTHROMBIN TIME: 33.2 SEC (ref 9.1–12)

## 2018-08-16 NOTE — PROGRESS NOTES
Anticoagulation Summary  As of 8/16/2018    INR goal:   2.0-3.0   TTR:   23.2 % (1.4 wk)   Today's INR:   3.4!   Warfarin maintenance plan:   7.5 mg (5 mg x 1.5) every day   Weekly warfarin total:   52.5 mg   Plan last modified:   Jordon Stauffer, PharmD (7/31/2018)   Next INR check:   8/23/2018   Target end date:   Indefinite    Indications    Atrial fibrillation with rapid ventricular response (HCC) [I48.91]             Anticoagulation Episode Summary     INR check location:       Preferred lab:       Send INR reminders to:       Comments:         Anticoagulation Care Providers     Provider Role Specialty Phone number    Ruel Duran M.D. Referring Internal Medicine [phone]    [facility] Responsible  [phone]    Shashi Lawler, PharmD Responsible          Anticoagulation Patient Findings    INR  supra-therapeutic.   Denies signs/symptoms of bleeding and/or thrombosis.   Denies changes to diet or medications.   Follow up appointment in 1 week(s).    2.5mg today then continue weekly warfarin dose as noted      Jordon Stauffer, PharmD

## 2018-08-24 ENCOUNTER — ANTICOAGULATION MONITORING (OUTPATIENT)
Dept: VASCULAR LAB | Facility: MEDICAL CENTER | Age: 53
End: 2018-08-24

## 2018-08-24 DIAGNOSIS — I48.91 ATRIAL FIBRILLATION WITH RAPID VENTRICULAR RESPONSE (HCC): ICD-10-CM

## 2018-08-24 LAB
INR PPP: 2.9 (ref 0.8–1.2)
PROTHROMBIN TIME: 28 SEC (ref 9.1–12)

## 2018-08-24 NOTE — PROGRESS NOTES
Anticoagulation Summary  As of 8/24/2018    INR goal:   2.0-3.0   TTR:   21.8 % (2.6 wk)   Today's INR:   2.9   Warfarin maintenance plan:   5 mg (5 mg x 1) on Fri; 7.5 mg (5 mg x 1.5) all other days   Weekly warfarin total:   50 mg   Plan last modified:   Piper Pedroza PharmD (8/24/2018)   Next INR check:   8/31/2018   Target end date:   Indefinite    Indications    Atrial fibrillation with rapid ventricular response (HCC) [I48.91]             Anticoagulation Episode Summary     INR check location:       Preferred lab:       Send INR reminders to:       Comments:         Anticoagulation Care Providers     Provider Role Specialty Phone number    Ruel Duran M.D. Referring Internal Medicine [phone]    [facility] Responsible  [phone]    Radha BansalD Responsible          Anticoagulation Patient Findings    Spoke with patient.  INR is therapeutic.   Pt denies any unusual s/s of bleeding, bruising, clotting or any changes to diet or medications. Denies any etoh, cranberries, supplements, or illness.   Pt verifies warfarin weekly dosing.       Will have pt start a slightly lower warfarin dose.    Repeat INR in 1 week(s).     Piper Pedroza, PharmD

## 2018-08-30 ENCOUNTER — TELEPHONE (OUTPATIENT)
Dept: CARDIOLOGY | Facility: MEDICAL CENTER | Age: 53
End: 2018-08-30

## 2018-08-30 ENCOUNTER — OFFICE VISIT (OUTPATIENT)
Dept: CARDIOLOGY | Facility: MEDICAL CENTER | Age: 53
End: 2018-08-30

## 2018-08-30 VITALS
DIASTOLIC BLOOD PRESSURE: 72 MMHG | WEIGHT: 206 LBS | HEIGHT: 65 IN | HEART RATE: 78 BPM | BODY MASS INDEX: 34.32 KG/M2 | OXYGEN SATURATION: 95 % | SYSTOLIC BLOOD PRESSURE: 124 MMHG

## 2018-08-30 DIAGNOSIS — I48.91 ATRIAL FIBRILLATION WITH RAPID VENTRICULAR RESPONSE (HCC): Primary | ICD-10-CM

## 2018-08-30 LAB — EKG IMPRESSION: NORMAL

## 2018-08-30 PROCEDURE — 99211 OFF/OP EST MAY X REQ PHY/QHP: CPT | Performed by: NURSE PRACTITIONER

## 2018-08-30 PROCEDURE — 93000 ELECTROCARDIOGRAM COMPLETE: CPT | Performed by: NURSE PRACTITIONER

## 2018-08-30 ASSESSMENT — ENCOUNTER SYMPTOMS
COUGH: 0
HEADACHES: 0
SHORTNESS OF BREATH: 0
SPUTUM PRODUCTION: 0
HEMOPTYSIS: 0
ORTHOPNEA: 0
PND: 0
NAUSEA: 0
DIZZINESS: 0
WHEEZING: 0
FEVER: 0
VOMITING: 0
CHILLS: 0
PALPITATIONS: 1
CLAUDICATION: 0

## 2018-08-30 NOTE — PROGRESS NOTES
Chief Complaint   Patient presents with   • Atrial Fibrillation       Subjective:   Corinne A Snyder is a 53 y.o. female who presents today after being hospitalized July 22-23, 2018 for new onset atrial fibrillation.  The patient had felt lightheaded with some hand tingling as well as palpitations.  She was found to be in atrial fibrillation with a heart rate of 160.  TSH and echocardiogram were unremarkable.  Intravenous amiodarone was started with chemical conversion to SR.  Since the patient has been discharged she has had 2 extremely short episodes of palpitations which lasted just a few seconds.  EKG today shows sinus rhythm.    The patient is compliant with amiodarone 200 mg daily and warfarin per Coumadin clinic.  She has no problems tolerating either medication.  She denies significant shortness of breath and orthopnea.    Past Medical History:   Diagnosis Date   • Arthritis    • Back pain      Past Surgical History:   Procedure Laterality Date   • TONSILLECTOMY  1969     Family History   Problem Relation Age of Onset   • Cancer Mother         Breast Cancer     Social History     Social History   • Marital status:      Spouse name: N/A   • Number of children: N/A   • Years of education: N/A     Occupational History   • Not on file.     Social History Main Topics   • Smoking status: Never Smoker   • Smokeless tobacco: Never Used   • Alcohol use No      Comment: occasional   • Drug use: No   • Sexual activity: Yes     Partners: Male      Comment: vasectomy     Other Topics Concern   • Not on file     Social History Narrative   • No narrative on file     Allergies   Allergen Reactions   • Pcn [Penicillins] Hives     Outpatient Encounter Prescriptions as of 8/30/2018   Medication Sig Dispense Refill   • amiodarone (CORDARONE) 200 MG Tab Take 1 Tab by mouth every day. 30 Tab 11   • warfarin (COUMADIN) 5 MG Tab Take 1 Tab by mouth every day. 30 Tab 0   • Cholecalciferol (VITAMIN D3 PO) Take 1 Cap by mouth  "every day.     • Methylsulfonylmethane (MSM) Powder Take 10 mL by mouth every day.     • zinc sulfate (ZINCATE) 220 (50 Zn) MG Cap Take 220 mg by mouth every day.     • ascorbic acid (ASCORBIC ACID) 500 MG Tab Take 500 mg by mouth every day.     • therapeutic multivitamin-minerals (THERAGRAN-M) Tab Take 1 Tab by mouth every day.       No facility-administered encounter medications on file as of 8/30/2018.      Review of Systems   Constitutional: Negative for chills and fever.   Respiratory: Negative for cough, hemoptysis, sputum production, shortness of breath and wheezing.    Cardiovascular: Positive for palpitations. Negative for chest pain, orthopnea, claudication, leg swelling and PND.   Gastrointestinal: Negative for nausea and vomiting.   Neurological: Negative for dizziness and headaches.        Objective:   /72   Pulse 78   Ht 1.651 m (5' 5\")   Wt 93.4 kg (206 lb)   SpO2 95%   BMI 34.28 kg/m²     Physical Exam   Constitutional: She is oriented to person, place, and time. She appears well-developed and well-nourished.   HENT:   Head: Normocephalic and atraumatic.   Eyes: EOM are normal.   Neck: Neck supple.   Cardiovascular: Normal rate, regular rhythm, normal heart sounds and intact distal pulses.    No murmur heard.  Pulmonary/Chest: Effort normal and breath sounds normal.   Abdominal: Soft. Bowel sounds are normal.   Musculoskeletal: She exhibits no edema.   Neurological: She is alert and oriented to person, place, and time.   Skin: Skin is warm and dry.   Psychiatric: She has a normal mood and affect. Her behavior is normal. Judgment and thought content normal.   Nursing note and vitals reviewed.      Assessment:     1. Atrial fibrillation with rapid ventricular response (HCC)  EKG       Medical Decision Making:  Today's Assessment / Status / Plan:   1. Atrial fibrillation  -Continue amiodarone 200 mg daily and warfarin as directed  -Obtain Ziopatch  -Follow-up with EP to determine whether or " not the patient needs to continue amiodarone and anticoagulation    Collaborating MD is Dr. Hussein.  Follow-up visit with EP.

## 2018-08-31 ENCOUNTER — ANTICOAGULATION MONITORING (OUTPATIENT)
Dept: VASCULAR LAB | Facility: MEDICAL CENTER | Age: 53
End: 2018-08-31

## 2018-08-31 DIAGNOSIS — I48.91 ATRIAL FIBRILLATION WITH RAPID VENTRICULAR RESPONSE (HCC): ICD-10-CM

## 2018-08-31 LAB
INR PPP: 2.8 (ref 0.8–1.2)
PROTHROMBIN TIME: 27.5 SEC (ref 9.1–12)

## 2018-09-14 ENCOUNTER — ANTICOAGULATION MONITORING (OUTPATIENT)
Dept: VASCULAR LAB | Facility: MEDICAL CENTER | Age: 53
End: 2018-09-14

## 2018-09-14 DIAGNOSIS — I48.91 ATRIAL FIBRILLATION WITH RAPID VENTRICULAR RESPONSE (HCC): ICD-10-CM

## 2018-09-14 LAB
INR PPP: 1.7 (ref 0.8–1.2)
PROTHROMBIN TIME: 16.9 SEC (ref 9.1–12)

## 2018-09-14 NOTE — PROGRESS NOTES
Anticoagulation Summary  As of 9/14/2018    INR goal:   2.0-3.0   TTR:   53.8 % (1.3 mo)   Today's INR:   1.7!   Warfarin maintenance plan:   5 mg (5 mg x 1) on Fri; 7.5 mg (5 mg x 1.5) all other days   Weekly warfarin total:   50 mg   Plan last modified:   Radha GuzmanD (8/24/2018)   Next INR check:   9/28/2018   Target end date:   Indefinite    Indications    Atrial fibrillation with rapid ventricular response (HCC) [I48.91]             Anticoagulation Episode Summary     INR check location:       Preferred lab:       Send INR reminders to:       Comments:   self pay patient      Anticoagulation Care Providers     Provider Role Specialty Phone number    Ruel Duran M.D. Referring Internal Medicine [phone]    [facility] Responsible  [phone]    Shashi Lawler, PharmD Responsible          Anticoagulation Patient Findings    Left voicemail message to report a subtherapeutic INR of 1.7.  Requested pt contact the clinic for any s/s of unusual bleeding, bruising, clotting or any changes to diet or medication.   Instructed patient to bolus with 7.5mg X 1, then resume current warfarin regimen.  FU 2 weeks.  Shashi Lawler PharmD

## 2018-09-28 LAB — INR PPP: 2.7 (ref 2–3.5)

## 2018-09-29 LAB
INR PPP: 2.7 (ref 0.8–1.2)
PROTHROMBIN TIME: 27.6 SEC (ref 9.1–12)

## 2018-10-01 ENCOUNTER — ANTICOAGULATION MONITORING (OUTPATIENT)
Dept: MEDICAL GROUP | Facility: MEDICAL CENTER | Age: 53
End: 2018-10-01

## 2018-10-01 ENCOUNTER — ANTICOAGULATION MONITORING (OUTPATIENT)
Dept: VASCULAR LAB | Facility: MEDICAL CENTER | Age: 53
End: 2018-10-01

## 2018-10-01 DIAGNOSIS — I48.91 ATRIAL FIBRILLATION WITH RAPID VENTRICULAR RESPONSE (HCC): ICD-10-CM

## 2018-10-01 NOTE — PROGRESS NOTES
Anticoagulation Summary  As of 10/1/2018    INR goal:   2.0-3.0   TTR:   57.9 % (1.8 mo)   Today's INR:   2.7 (9/28/2018)   Warfarin maintenance plan:   5 mg (5 mg x 1) on Fri; 7.5 mg (5 mg x 1.5) all other days   Weekly warfarin total:   50 mg   Plan last modified:   Piper Pedroza, PharmD (8/24/2018)   Next INR check:   10/12/2018   Target end date:   Indefinite    Indications    Atrial fibrillation with rapid ventricular response (HCC) [I48.91]             Anticoagulation Episode Summary     INR check location:       Preferred lab:       Send INR reminders to:       Comments:   self pay patient      Anticoagulation Care Providers     Provider Role Specialty Phone number    Ruel Duran M.D. Referring Internal Medicine [phone]    [facility] Responsible  [phone]    Radha BansalD Responsible          Anticoagulation Patient Findings  Patient Findings     Negatives:   Signs/symptoms of thrombosis, Signs/symptoms of bleeding, Laboratory test error suspected, Change in health, Change in alcohol use, Change in activity, Upcoming invasive procedure, Emergency department visit, Upcoming dental procedure, Missed doses, Extra doses, Change in medications, Change in diet/appetite, Hospital admission, Bruising, Other complaints        Spoke with the patient on the phone today, reporting a therapeutic INR of 2.7.  Confirmed the current warfarin dosing regimen and patient compliance. Patient was subtherapeutic last time and was told to increase fridays dose to 7.5mg. She was unsure whether she was supposed to do it all fridays, so she continued 7.5mg the Friday after, but not this past Friday. Patient denies any interval changes to diet and/or medications. Patient denies any signs/symptoms of bleeding or clotting.  Patient will continue with the current dosing regimen and will follow up again in 2 weeks.     Eliud GarciaD

## 2018-10-12 LAB — INR PPP: 4.3 (ref 2–3.5)

## 2018-10-13 LAB
INR PPP: 4.3 (ref 0.8–1.2)
PROTHROMBIN TIME: 43.1 SEC (ref 9.1–12)

## 2018-10-15 ENCOUNTER — ANTICOAGULATION MONITORING (OUTPATIENT)
Dept: VASCULAR LAB | Facility: MEDICAL CENTER | Age: 53
End: 2018-10-15

## 2018-10-15 DIAGNOSIS — I48.91 ATRIAL FIBRILLATION WITH RAPID VENTRICULAR RESPONSE (HCC): ICD-10-CM

## 2018-10-15 NOTE — PROGRESS NOTES
Anticoagulation Summary  As of 10/15/2018    INR goal:   2.0-3.0   TTR:   49.8 % (2.2 mo)   Today's INR:   4.3! (10/12/2018)   Warfarin maintenance plan:   5 mg (5 mg x 1) on Fri; 7.5 mg (5 mg x 1.5) all other days   Weekly warfarin total:   50 mg   Plan last modified:   Piper Pedroza, PharmD (8/24/2018)   Next INR check:   10/22/2018   Target end date:   Indefinite    Indications    Atrial fibrillation with rapid ventricular response (HCC) [I48.91]             Anticoagulation Episode Summary     INR check location:       Preferred lab:       Send INR reminders to:       Comments:   self pay patient      Anticoagulation Care Providers     Provider Role Specialty Phone number    Ruel Duran M.D. Referring Internal Medicine [phone]    [facility]ticoagulation Services Responsible  [phone]    Shashi Lawler, PharmD Responsible          Anticoagulation Patient Findings      Spoke to patient on the phone.   INR  supra-therapeutic.   Denies signs/symptoms of bleeding and/or thrombosis.   Denies changes to diet or medications.   Follow up appointment in 2 week(s) per pt   She already missed Saturday's dose of warfarin, so no need to hold warfarin today just continue weekly warfarin dose as noted      Jordon Stauffer, PharmD

## 2018-10-19 DIAGNOSIS — Z79.01 CHRONIC ANTICOAGULATION: ICD-10-CM

## 2018-10-19 RX ORDER — WARFARIN SODIUM 5 MG/1
7.5 TABLET ORAL DAILY
Qty: 45 TAB | Refills: 2 | Status: SHIPPED | OUTPATIENT
Start: 2018-10-19 | End: 2019-01-29 | Stop reason: SDUPTHER

## 2018-10-29 LAB
INR PPP: 2.6 (ref 0.8–1.2)
PROTHROMBIN TIME: 27.6 SEC (ref 9.1–12)

## 2018-10-30 ENCOUNTER — ANTICOAGULATION MONITORING (OUTPATIENT)
Dept: VASCULAR LAB | Facility: MEDICAL CENTER | Age: 53
End: 2018-10-30

## 2018-10-30 DIAGNOSIS — I48.91 ATRIAL FIBRILLATION WITH RAPID VENTRICULAR RESPONSE (HCC): ICD-10-CM

## 2018-10-31 DIAGNOSIS — I48.91 ATRIAL FIBRILLATION, UNSPECIFIED TYPE (HCC): ICD-10-CM

## 2018-10-31 NOTE — PROGRESS NOTES
Anticoagulation Summary  As of 10/30/2018    INR goal:   2.0-3.0   TTR:   44.3 % (2.8 mo)   Today's INR:   2.6 (10/29/2018)   Warfarin maintenance plan:   5 mg (5 mg x 1) on Mon, Fri; 7.5 mg (5 mg x 1.5) all other days   Weekly warfarin total:   47.5 mg   Plan last modified:   Shashi Lawler PharmD (10/31/2018)   Next INR check:   11/21/2018   Target end date:   Indefinite    Indications    Atrial fibrillation with rapid ventricular response (HCC) [I48.91]             Anticoagulation Episode Summary     INR check location:       Preferred lab:       Send INR reminders to:       Comments:   self pay patient      Anticoagulation Care Providers     Provider Role Specialty Phone number    Ruel Duran M.D. Referring Internal Medicine [phone]    [facility] Anticoagulation Services Responsible  [phone]    Shashi Lawler PharmD Responsible          Anticoagulation Patient Findings  Patient Findings     Negatives:   Signs/symptoms of thrombosis, Signs/symptoms of bleeding, Laboratory test error suspected, Change in health, Change in alcohol use, Change in activity, Upcoming invasive procedure, Emergency department visit, Upcoming dental procedure, Missed doses, Extra doses, Change in medications, Change in diet/appetite, Hospital admission, Bruising, Other complaints        Spoke with patient today regarding therapeutic INR of 2.6.  Patient denies any signs/symptoms of bruising or bleeding or any changes in diet and medications.  Instructed patient to call clinic with any questions or concerns.  Pt is to continue with current warfarin dosing regimen.  Follow up in 2 weeks, to reduce risk of adverse events related to this high risk medication,  Warfarin.    Shashi Lawler PharmD

## 2018-10-31 NOTE — PROGRESS NOTES
Patient cannot afford Zio monitor as originally planned.  Patient is self pay.  Patient accommodated and a 48 hour Holter monitor ordered as alternative.

## 2018-12-04 ENCOUNTER — ANTICOAGULATION MONITORING (OUTPATIENT)
Dept: VASCULAR LAB | Facility: MEDICAL CENTER | Age: 53
End: 2018-12-04

## 2018-12-04 DIAGNOSIS — I48.91 ATRIAL FIBRILLATION WITH RAPID VENTRICULAR RESPONSE (HCC): ICD-10-CM

## 2018-12-04 LAB
INR PPP: 2.4 (ref 0.8–1.2)
PROTHROMBIN TIME: 25.2 SEC (ref 9.1–12)

## 2018-12-04 NOTE — PROGRESS NOTES
Anticoagulation Summary  As of 12/4/2018    INR goal:   2.0-3.0   TTR:   60.7 % (4 mo)   Today's INR:   2.4 (12/3/2018)   Warfarin maintenance plan:   5 mg (5 mg x 1) on Mon, Fri; 7.5 mg (5 mg x 1.5) all other days   Weekly warfarin total:   47.5 mg   Plan last modified:   Shashi Lawler PharmD (10/31/2018)   Next INR check:   1/15/2019   Target end date:   Indefinite    Indications    Atrial fibrillation with rapid ventricular response (HCC) [I48.91]             Anticoagulation Episode Summary     INR check location:       Preferred lab:       Send INR reminders to:       Comments:   self pay patient      Anticoagulation Care Providers     Provider Role Specialty Phone number    Ruel Duran M.D. Referring Internal Medicine [phone]    [facility] Anticoagulation [facility] Responsible  [phone]    Shashi Lawler PharmD Responsible          Anticoagulation Patient Findings      INR  therapeutic.   Left a voice message for the patient, asked patient to please call the anticoagulation clinic if they have any signs/symptoms of bleeding and/or thrombosis or any changes to diet or medications.    Follow up appointment in 6 week(s)    Continue weekly warfarin dose as noted      Jordon Stauffer PharmD

## 2019-01-31 ENCOUNTER — TELEPHONE (OUTPATIENT)
Dept: VASCULAR LAB | Facility: MEDICAL CENTER | Age: 54
End: 2019-01-31

## 2019-02-01 ENCOUNTER — TELEPHONE (OUTPATIENT)
Dept: VASCULAR LAB | Facility: MEDICAL CENTER | Age: 54
End: 2019-02-01

## 2019-02-01 DIAGNOSIS — I48.91 ATRIAL FIBRILLATION WITH RAPID VENTRICULAR RESPONSE (HCC): ICD-10-CM

## 2019-02-02 LAB
INR PPP: 4 (ref 0.8–1.2)
PROTHROMBIN TIME: 39.8 SEC (ref 9.1–12)

## 2019-02-04 ENCOUNTER — ANTICOAGULATION MONITORING (OUTPATIENT)
Dept: VASCULAR LAB | Facility: MEDICAL CENTER | Age: 54
End: 2019-02-04

## 2019-02-04 DIAGNOSIS — I48.91 ATRIAL FIBRILLATION WITH RAPID VENTRICULAR RESPONSE (HCC): ICD-10-CM

## 2019-02-11 ENCOUNTER — TELEPHONE (OUTPATIENT)
Dept: VASCULAR LAB | Facility: MEDICAL CENTER | Age: 54
End: 2019-02-11

## 2019-02-11 NOTE — TELEPHONE ENCOUNTER
Spoke with pt on the phone, her number was wrong and she has not gotten our message regarding INR of 4.0 to 2/4/2019. Pt reports pink colored urine for a few weeks now, she denies s/s of UTI. Advised pt to see PCP regarding this and to hold warfarin today. Pt to go to lab today for further instructions. Advised pt to seek immediate medical attention for worsening of blood in urine.     Angelica Omer, Pharm D

## 2019-02-12 ENCOUNTER — ANTICOAGULATION MONITORING (OUTPATIENT)
Dept: VASCULAR LAB | Facility: MEDICAL CENTER | Age: 54
End: 2019-02-12

## 2019-02-12 DIAGNOSIS — I48.91 ATRIAL FIBRILLATION WITH RAPID VENTRICULAR RESPONSE (HCC): ICD-10-CM

## 2019-02-12 LAB
INR PPP: 3.5 (ref 0.8–1.2)
PROTHROMBIN TIME: 35.7 SEC (ref 9.1–12)

## 2019-02-12 NOTE — PROGRESS NOTES
Anticoagulation Summary  As of 2/12/2019    INR goal:   2.0-3.0   TTR:   50.1 % (6.3 mo)   INR used for dosing:   3.5! (2/11/2019)   Warfarin maintenance plan:   5 mg (5 mg x 1) every Mon, Fri; 7.5 mg (5 mg x 1.5) all other days   Weekly warfarin total:   47.5 mg   Plan last modified:   Shashi Lawler PharmD (10/31/2018)   Next INR check:   2/25/2019   Target end date:   Indefinite    Indications    Atrial fibrillation with rapid ventricular response (HCC) [I48.91]             Anticoagulation Episode Summary     INR check location:       Preferred lab:       Send INR reminders to:       Comments:   self pay patient      Anticoagulation Care Providers     Provider Role Specialty Phone number    Ruel Duran M.D. Referring Internal Medicine [phone]    [facility] Anticoagulation Services Responsible  [phone]    Shashi Lawler PharmD Responsible          Anticoagulation Patient Findings    Left a message on the patient's voicemail today, informing the patient of a SUPRA-therapeutic INR of 3.5. Instructed patient to call the clinic with any changes to diet or medications, with any signs/sx of bleeding or clotting or with any questions or concerns.  Patient was instructed to HOLD dose yesterday when she called the clinic due to pink urine, and she was instructed to decrease dose to 5mg tonight as well, and then resume her current regimen. Patient asked to follow up again in 2 weeks.    Eliud Crews PharmD

## 2019-02-19 ENCOUNTER — ANTICOAGULATION MONITORING (OUTPATIENT)
Dept: VASCULAR LAB | Facility: MEDICAL CENTER | Age: 54
End: 2019-02-19

## 2019-02-19 DIAGNOSIS — I48.91 ATRIAL FIBRILLATION WITH RAPID VENTRICULAR RESPONSE (HCC): ICD-10-CM

## 2019-02-19 LAB
INR PPP: 2.8 (ref 0.8–1.2)
PROTHROMBIN TIME: 29.1 SEC (ref 9.1–12)

## 2019-02-19 NOTE — PROGRESS NOTES
Anticoagulation Summary  As of 2019    INR goal:   2.0-3.0   TTR:   49.3 % (6.5 mo)   INR used for dosin.8 (2019)   Warfarin maintenance plan:   5 mg (5 mg x 1) every Mon, Fri; 7.5 mg (5 mg x 1.5) all other days   Weekly warfarin total:   47.5 mg   Plan last modified:   Shashi Lawler PharmD (10/31/2018)   Next INR check:   2019   Target end date:   Indefinite    Indications    Atrial fibrillation with rapid ventricular response (HCC) [I48.91]             Anticoagulation Episode Summary     INR check location:       Preferred lab:       Send INR reminders to:       Comments:   self pay patient      Anticoagulation Care Providers     Provider Role Specialty Phone number    Ruel Duran M.D. Referring Internal Medicine [phone]    [facility] Anticoagulation Services Responsible  [phone]    Radha BansalD Responsible          Anticoagulation Patient Findings  Patient Findings     Negatives:   Signs/symptoms of thrombosis, Signs/symptoms of bleeding, Laboratory test error suspected, Change in health, Change in alcohol use, Change in activity, Upcoming invasive procedure, Emergency department visit, Upcoming dental procedure, Missed doses, Extra doses, Change in medications, Change in diet/appetite, Hospital admission, Bruising, Other complaints      Left voicemail message to report 2.8 therapeutic INR of 2.0-3.0.  Patient to continue with current warfarin dosing regimen. Requested pt contact the clinic for any change to diet or medication, and to report any signs or symptoms of bleeding, bruising or clotting.  Pt to follow up in 1 weeks.  Katie Bourne, Manish Ass't      I have reviewed and concur with the above plan on 2019.  Wendy Acosta, Clinical Pharmacist

## 2019-02-24 ENCOUNTER — OCCUPATIONAL MEDICINE (OUTPATIENT)
Dept: URGENT CARE | Facility: PHYSICIAN GROUP | Age: 54
End: 2019-02-24
Payer: COMMERCIAL

## 2019-02-24 VITALS
WEIGHT: 201 LBS | DIASTOLIC BLOOD PRESSURE: 84 MMHG | TEMPERATURE: 98.3 F | BODY MASS INDEX: 33.45 KG/M2 | SYSTOLIC BLOOD PRESSURE: 128 MMHG | HEART RATE: 61 BPM | OXYGEN SATURATION: 94 %

## 2019-02-24 DIAGNOSIS — M62.830 SPASM OF THORACIC BACK MUSCLE: ICD-10-CM
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
## 2019-02-24 PROCEDURE — 99203 OFFICE O/P NEW LOW 30 MIN: CPT | Mod: 29 | Performed by: PHYSICIAN ASSISTANT

## 2019-02-24 RX ORDER — ACETAMINOPHEN 500 MG
500-1000 TABLET ORAL EVERY 6 HOURS PRN
Qty: 30 TAB | Refills: 0 | Status: SHIPPED | OUTPATIENT
Start: 2019-02-24

## 2019-02-24 RX ORDER — CYCLOBENZAPRINE HCL 10 MG
10 TABLET ORAL 3 TIMES DAILY PRN
Qty: 30 TAB | Refills: 0 | Status: SHIPPED | OUTPATIENT
Start: 2019-02-24 | End: 2021-05-17

## 2019-02-24 NOTE — PROGRESS NOTES
Subjective:      Corinne A Snyder is a 53 y.o. female who presents with Shoulder Pain (upper trapezius muscle pain bilaterally with a buise on the right arm after a slip and fall at work Friday. (Workman's Comp))      DOI: 2/22/19. 6:30pm. Was walking and hit patch of ice and slipped. Feet went straight out and she landed on her butt. She reached out to grab the railing and hit her right elbow. Unsure if hit head, no LOC. Today, upper back, right elbow pain, and buttocks pain. Large bruise on right elbow, patient on Coumadin. Has not taken anything for the symptoms. No HA, Dizziness, abd pain, hematuria, numbness/tinlging, bowel/bladder incontinence. No previous injuries: Back injury in her 30's, no residual issues. No 2nd job. Tolerated full duty.     Shoulder Pain         ROS    Medications, Allergies, and current problem list reviewed today in Epic     Objective:     /84   Pulse 61   Temp 36.8 °C (98.3 °F)   Wt 91.2 kg (201 lb)   SpO2 94%   BMI 33.45 kg/m²      Physical Exam   Constitutional: She is oriented to person, place, and time. She appears well-developed and well-nourished. No distress.   HENT:   Head: Normocephalic and atraumatic.   Eyes: Conjunctivae and EOM are normal.   Neck: Normal range of motion. Neck supple.   Cardiovascular: Normal rate, regular rhythm and normal heart sounds.    Pulmonary/Chest: Effort normal and breath sounds normal. No respiratory distress. She has no wheezes.   Neurological: She is alert and oriented to person, place, and time.   Skin: Skin is warm and dry. She is not diaphoretic.   Psychiatric: She has a normal mood and affect. Her behavior is normal. Judgment and thought content normal.   Nursing note and vitals reviewed.      Vitals reviewed, well-appearing female no apparent distress.  Rani, EOMs intact.   strength equal.  Lower external strength and DTRs equal.  Gross sensory neuro intact  Mild generalized upper back tenderness.  No pinpoint pain,  abnormality, swelling seen.  No midline tenderness.  No radicular symptoms.  Right elbow pain around the area of large bruise.  Range of motion normal,  strength equal.  No signs of structural damage.       Assessment/Plan:      1. Fall, initial encounter  acetaminophen (TYLENOL) 500 MG Tab    cyclobenzaprine (FLEXERIL) 10 MG Tab   2. Spasm of thoracic back muscle  cyclobenzaprine (FLEXERIL) 10 MG Tab     No signs of head injury.  No loss of consciousness.  No red flag symptoms, vital signs normal, neuro exam unremarkable.  Tylenol for pain  Flexeril at night, do not drive or work while taking medication  OTC meds and conservative measures as discussed  Return to clinic or go to ED if symptoms worsen or persist. Indications for ED discussed at length. Patient voices understanding. Red flags discussed.All side effects of medication discussed including allergic response, GI upset, tendon injury, etc.      Please note that this dictation was created using voice recognition software. I have made every reasonable attempt to correct obvious errors, but I expect that there are errors of grammar and possibly content that I did not discover before finalizing the note.

## 2019-02-24 NOTE — LETTER
EMPLOYEE’S CLAIM FOR COMPENSATION/ REPORT OF INITIAL TREATMENT  FORM C-4    EMPLOYEE’S CLAIM - PROVIDE ALL INFORMATION REQUESTED   First Name  Corinne Last Name  Shirley Birthdate                    1965                Sex  female Claim Number   Home Address  [address] Age  53 y.o. 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 (if applicable)  walking from white house and to kitchen    How did this injury or occupational disease occur? (Be specific an answer in detail. Use additional sheet if necessary)  slipped and fell on black ice walking on walk way between [place] by the [facility] area. Both feet went out from under me, I grabbed the railing, I landed on my left butt cheek and was on the ice for frame   If you believe that you have an occupational disease, when did you first have knowledge of the disability and it relationship to your employment?  NA Witnesses to the Accident  No witnesses, it was dark and caregivers were in the houses      Nature of Injury or Occupational Disease  Strain  Part(s) of Body Injured or Affected  Elbow (R), Upper  Back Area (Thoracic Area),     I certify that the above is true and correct to the best of my knowledge and that I have provided this information in order to obtain the benefits of [place]  I hereby authorize any physician, chiropractor, surgeon, practitioner, or other person, any hospital, including [facility] or [facility], any medical service organization, any insurance company, or other institution or organization to release to each other, any medical or other information, including benefits paid or payable, pertinent to this injury or disease, except information relative to diagnosis, treatment and/or counseling for AIDS, psychological conditions, alcohol or controlled substances, for which I must give specific authorization.  A Photostat of this authorization shall be as valid as the original.     Date 2/24/19   [facility]    Employee’s Signature   THIS REPORT MUST BE COMPLETED AND MAILED WITHIN 3 WORKING DAYS OF TREATMENT   Place  [facility]  Name of Facility  [facility]   Date  2/24/2019 Diagnosis  (W19.XXXA) Fall, initial encounter  (M62.830) Spasm of thoracic back muscle Is there evidence the injured employee was under the influence of alcohol and/or another controlled substance at the time of accident?   Hour  10:59 AM Description of Injury or Disease  Diagnoses of Fall, initial encounter and Spasm of thoracic back muscle were pertinent to this visit. No   Treatment  Tylenol for pain.  Flexeril at night do not take for work  Follow-up 4 days  OTC meds and conservative measures as discussed    Have you advised the patient to remain off work five days or more? No   X-Ray Findings      If Yes   From Date  To Date      From information given by the employee, together with medical evidence, can you directly connect this injury or occupational disease as job  "incurred?  Yes If No Full Duty  No Modified Duty  Yes   Is additional medical care by a physician indicated?  Yes If Modified Duty, Specify any Limitations / Restrictions  Weight limit 10 pounds   Do you know of any previous injury or disease contributing to this condition or occupational disease?                            No   Date  2/24/2019 Print Doctor’s Name Carlos Carlson P.A.-C. I certify the employer’s copy of  this form was mailed on:   Address  [address] [place] [address]. [address] Insurer’s Use Only     [facility]  [address]    Provider’s Tax ID Number  [id number] Telephone  Dept: [phone]        megan-CARLOS Orozco P.A.-C.   e-Signature: Dr. Nishant Gallego, Medical Director Degree  AMELIA        ORIGINAL-TREATING PHYSICIAN OR CHIROPRACTOR    PAGE 2-INSURER/TPA    PAGE 3-EMPLOYER    PAGE 4-EMPLOYEE             Form C-4 (rev10/07)              BRIEF DESCRIPTION OF RIGHTS AND BENEFITS  (Pursuant to [place]RS 616C.050)    Notice of Injury or Occupational Disease (Incident Report Form C-1): If an injury or occupational disease (OD) arises out of and in the  course of employment, you must provide written notice to your employer as soon as practicable, but no later than 7 days after the accident or  OD. Your employer shall maintain a sufficient supply of the required forms.    Claim for Compensation (Form C-4): If medical treatment is sought, the form C-4 is available at the place of initial treatment. A completed  \"Claim for Compensation\" (Form C-4) must be filed within 90 days after an accident or OD. The treating physician or chiropractor must,  within 3 working days after treatment, complete and mail to the employer, the employer's insurer and third-party , the Claim for  Compensation.    Medical Treatment: If you require medical treatment for your on-the-job injury or OD, you may be required to select a physician or  chiropractor from a list provided by your workers’ compensation " insurer, if it has contracted with an Organization for Managed Care (MCO) or  Preferred Provider Organization (PPO) or providers of health care. If your employer has not entered into a contract with an MCO or PPO, you  may select a physician or chiropractor from the Panel of Physicians and Chiropractors. Any medical costs related to your industrial injury or  OD will be paid by your insurer.    Temporary Total Disability (TTD): If your doctor has certified that you are unable to work for a period of at least 5 consecutive days, or 5  cumulative days in a 20-day period, or places restrictions on you that your employer does not accommodate, you may be entitled to TTD  compensation.    Temporary Partial Disability (TPD): If the wage you receive upon reemployment is less than the compensation for TTD to which you are  entitled, the insurer may be required to pay you TPD compensation to make up the difference. TPD can only be paid for a maximum of 24  months.    Permanent Partial Disability (PPD): When your medical condition is stable and there is an indication of a PPD as a result of your injury or  OD, within 30 days, your insurer must arrange for an evaluation by a rating physician or chiropractor to determine the degree of your PPD. The  amount of your PPD award depends on the date of injury, the results of the PPD evaluation and your age and wage.    Permanent Total Disability (PTD): If you are medically certified by a treating physician or chiropractor as permanently and totally disabled  and have been granted a PTD status by your insurer, you are entitled to receive monthly benefits not to exceed 66 2/3% of your average  monthly wage. The amount of your PTD payments is subject to reduction if you previously received a PPD award.    Vocational Rehabilitation Services: You may be eligible for vocational rehabilitation services if you are unable to return to the job due to a  permanent physical impairment or  permanent restrictions as a result of your injury or occupational disease.    Transportation and Per Manjinder Reimbursement: You may be eligible for travel expenses and per manjinder associated with medical treatment.    Reopening: You may be able to reopen your claim if your condition worsens after claim closure.    Appeal Process: If you disagree with a written determination issued by the insurer or the insurer does not respond to your request, you may  appeal to the Department of Administration, , by following the instructions contained in your determination letter. You must  appeal the determination within 70 days from the date of the determination letter at [address], [address], [place], [place]  [address], or [address], [address], [place], [place] [address]. If you disagree with the  decision, you may appeal to the  Department of Administration, . You must file your appeal within 30 days from the date of the  decision  letter at [address], [address], [place], [place] [address], or [address], [place], [place] [address]. If you  disagree with a decision of an , you may file a petition for judicial review with the District Court. You must do so within 30  days of the Appeal Officer’s decision. You may be represented by an  at your own expense or you may contact the [facility] for possible  representation.    [place]  for Injured Workers (NAIW): If you disagree with a  decision, you may request that NAIW represent you  without charge at an  Hearing. For information regarding denial of benefits, you may contact the [facility] at: [address], [address], [place] [address], [phone], or [address], [address], [place] [address], [phone]    To File a Complaint with the Division: If you wish to file a complaint with the   of the Division of Industrial Relations (DIR),  please contact the Workers’ Compensation Section, [address], [address], [place], [place] [address], telephone [phone]) [phone], or  [address], [address], [place], [place] [address], telephone [phone].    For assistance with Workers’ Compensation Issues: you may contact the Office of the Governor Consumer Health Assistance, [address] [place] [address], [address], [place], [place] [address], Toll Free 1-937.857.8544, Web site: http://govcha.[facility].nv., E-mail  [email or web address]                                                                                                                                                                                                                                   __________________________________________________________________                                                                   _________________                Employee Name / Signature                                                                                                                                                       Date                                                                                                                                                                                                     D-2 (rev. 10/07)

## 2019-02-24 NOTE — LETTER
[facility]  [address] - [place] [address]  Phone:  [phone] - Fax:  [phone]   [facility] Progress Report and Disability Certification  Date of Service: 2/24/2019   No Show:  No  Date / Time of Next Visit: 3/1/2019   Claim Information   Patient Name: Corinne A Snyder  Claim Number:     Employer:   [employer]  Date of Injury: 2/22/2019     Insurer / TPA:  ID / SSN:     Occupation: DIETARY AIDE/COOK  Diagnosis: Diagnoses of Fall, initial encounter and Spasm of thoracic back muscle were pertinent to this visit.    Medical Information   Related to Industrial Injury? Yes    Subjective Complaints:  DOI: 2/22/19. 6:30pm. Was walking and hit patch of ice and slipped. Feet went straight out and she landed on her butt. She reached out to grab the railing and hit her right elbow. Unsure if hit head, no LOC. Today, upper back, right elbow pain, and buttocks pain. Large bruise on right elbow, patient on Coumadin. Has not taken anything for the symptoms. No HA, Dizziness, abd pain, hematuria, numbness/tinlging, bowel/bladder incontinence. No previous injuries: Back injury in her 30's, no residual issues. No 2nd job. Tolerated full duty.   Objective Findings: Vitals reviewed, well-appearing female no apparent distress.  Rani, EOMs intact.   strength equal.  Lower external strength and DTRs equal.  Gross sensory neuro intact  Mild generalized upper back tenderness.  No pinpoint pain, abnormality, swelling seen.  No midline tenderness.  No radicular symptoms.  Right elbow pain around the area of large bruise.  Range of motion normal,  strength equal.  No signs of structural damage.   Pre-Existing Condition(s): None   Assessment:   Initial Visit    Status: Additional Care Required  Permanent Disability:No    Plan: MedicationMedication (NOT at Work)    Diagnostics:      Comments:       Disability Information   Status: Released to  Restricted Duty    From:  2/24/2019  Through: 3/1/2019 Restrictions are: Temporary   Physical Restrictions   Sitting:    Standing:    Stooping:    Bending:      Squatting:    Walking:    Climbing:    Pushing:      Pulling:    Other:    Reaching Above Shoulder (L):   Reaching Above Shoulder (R):       Reaching Below Shoulder (L):    Reaching Below Shoulder (R):      Not to exceed Weight Limits   Carrying(hrs):   Weight Limit(lb): < or = to 10 pounds Lifting(hrs):   Weight  Limit(lb): < or = to 10 pounds   Comments:      Repetitive Actions   Hands: i.e. Fine Manipulations from Grasping:     Feet: i.e. Operating Foot Controls:     Driving / Operate Machinery:     Physician Name: Carlos Carlson P.A.-C. Physician Signature: CARLOS Solis P.A.-C. e-Signature: Dr. Nishant Gallego, Medical Director   Clinic Name / Location: [address]  [place] [address] Clinic Phone Number: Dept: [phone]   Appointment Time: 10:20 Am Visit Start Time: 10:59 AM   Check-In Time:  10:49 Am Visit Discharge Time:     Original-Treating Physician or Chiropractor    Page 2-Insurer/TPA    Page 3-Employer    Page 4-Employee

## 2019-02-24 NOTE — LETTER
EMPLOYEE’S CLAIM FOR COMPENSATION/ REPORT OF INITIAL TREATMENT  FORM C-4    EMPLOYEE’S CLAIM - PROVIDE ALL INFORMATION REQUESTED   First Name  Corinne Last Name  Shirley Birthdate                    1965                Sex  female Claim Number   Home Address  [address] Age  53 y.o. 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 [address]]   What were you doing at the time of accident? (if applicable)  walking from white house and to kitchen    How did this injury or occupational disease occur? (Be specific an answer in detail. Use additional sheet if necessary)  slipped and fell on black ice walking on the walk way [place] [address] and [facility] by the [facility] area. Both feet went out from under me, I grabbed the railing, I landed on my left butt cheek and was on the ice for a min. I happened so fast.    If you believe that you have an occupational disease, when did you first have knowledge of the disability and it relationship to your employment?  NA Witnesses to the Accident  No witnesses, it was dark and caregivers were in the houses      Nature of Injury or Occupational Disease  Strain  Part(s) of Body  Injured or Affected  Elbow (R), Upper Back Area (Thoracic Area),     I certify that the above is true and correct to the best of my knowledge and that I have provided this information in order to obtain the benefits of [place]  I hereby authorize any physician, chiropractor, surgeon, practitioner, or other person, any hospital, including [facility] or [facility], any medical service organization, any insurance company, or other institution or organization to release to each other, any medical or other information, including benefits paid or payable, pertinent to this injury or disease, except information relative to diagnosis, treatment and/or counseling for AIDS, psychological conditions, alcohol or controlled substances, for which I must give specific authorization.  A Photostat of this authorization shall be as valid as the original.     Date 2/24/19   [facility]   Employee’s Signature   THIS REPORT MUST BE COMPLETED AND MAILED WITHIN 3 WORKING DAYS OF TREATMENT   Place  [facility] [place]  Name of Facility  [facility]   Date  2/24/2019 Diagnosis  (W19.XXXA) Fall, initial encounter  (M62.830) Spasm of thoracic back muscle Is there evidence the injured employee was under the influence of alcohol and/or another controlled substance at the time of accident?   Hour  10:59 AM Description of Injury or Disease  Diagnoses of Fall, initial encounter and Spasm of thoracic back muscle were pertinent to this visit. No   Treatment  Tylenol for pain.  Flexeril at night do not take for work  Follow-up 4 days  OTC meds and conservative measures as discussed    Have you advised the patient to remain off work five days or more? No   X-Ray Findings      If Yes   From Date  To Date      From information given by the employee, together with medical evidence, can you directly connect this  "injury or occupational disease as job incurred?  Yes If No Full Duty  No Modified Duty  Yes   Is additional medical care by a physician indicated?  Yes If Modified Duty, Specify any Limitations / Restrictions  Weight limit 10 pounds   Do you know of any previous injury or disease contributing to this condition or occupational disease?                            No   Date  2/24/2019 Print Doctor’s Name Carlos Carlson P.A.-C. I certify the employer’s copy of  this form was mailed on:   Address  [address] [place] [address]. [address] Insurer’s Use Only     [facility]  [address]    Provider’s Tax ID Number  [id number] Telephone  Dept: [phone]        e-CARLOS Orozco P.A.-C.   e-Signature: Dr. Nishant Gallego, Medical Director Degree  AMELIA        ORIGINAL-TREATING PHYSICIAN OR CHIROPRACTOR    PAGE 2-INSURER/TPA    PAGE 3-EMPLOYER    PAGE 4-EMPLOYEE             Form C-4 (rev10/07)              BRIEF DESCRIPTION OF RIGHTS AND BENEFITS  (Pursuant to [place]RS 616C.050)    Notice of Injury or Occupational Disease (Incident Report Form C-1): If an injury or occupational disease (OD) arises out of and in the  course of employment, you must provide written notice to your employer as soon as practicable, but no later than 7 days after the accident or  OD. Your employer shall maintain a sufficient supply of the required forms.    Claim for Compensation (Form C-4): If medical treatment is sought, the form C-4 is available at the place of initial treatment. A completed  \"Claim for Compensation\" (Form C-4) must be filed within 90 days after an accident or OD. The treating physician or chiropractor must,  within 3 working days after treatment, complete and mail to the employer, the employer's insurer and third-party , the Claim for  Compensation.    Medical Treatment: If you require medical treatment for your on-the-job injury or OD, you may be required to select a physician or  chiropractor from a list " provided by your workers’ compensation insurer, if it has contracted with an Organization for Managed Care (MCO) or  Preferred Provider Organization (PPO) or providers of health care. If your employer has not entered into a contract with an MCO or PPO, you  may select a physician or chiropractor from the Panel of Physicians and Chiropractors. Any medical costs related to your industrial injury or  OD will be paid by your insurer.    Temporary Total Disability (TTD): If your doctor has certified that you are unable to work for a period of at least 5 consecutive days, or 5  cumulative days in a 20-day period, or places restrictions on you that your employer does not accommodate, you may be entitled to TTD  compensation.    Temporary Partial Disability (TPD): If the wage you receive upon reemployment is less than the compensation for TTD to which you are  entitled, the insurer may be required to pay you TPD compensation to make up the difference. TPD can only be paid for a maximum of 24  months.    Permanent Partial Disability (PPD): When your medical condition is stable and there is an indication of a PPD as a result of your injury or  OD, within 30 days, your insurer must arrange for an evaluation by a rating physician or chiropractor to determine the degree of your PPD. The  amount of your PPD award depends on the date of injury, the results of the PPD evaluation and your age and wage.    Permanent Total Disability (PTD): If you are medically certified by a treating physician or chiropractor as permanently and totally disabled  and have been granted a PTD status by your insurer, you are entitled to receive monthly benefits not to exceed 66 2/3% of your average  monthly wage. The amount of your PTD payments is subject to reduction if you previously received a PPD award.    Vocational Rehabilitation Services: You may be eligible for vocational rehabilitation services if you are unable to return to the job due to  a  permanent physical impairment or permanent restrictions as a result of your injury or occupational disease.    Transportation and Per Manjinder Reimbursement: You may be eligible for travel expenses and per manjinder associated with medical treatment.    Reopening: You may be able to reopen your claim if your condition worsens after claim closure.    Appeal Process: If you disagree with a written determination issued by the insurer or the insurer does not respond to your request, you may  appeal to the Department of Administration, , by following the instructions contained in your determination letter. You must  appeal the determination within 70 days from the date of the determination letter at [address], [address], [place], [place]  [address], or [address], [address], [place] [address]. If you disagree with the  decision, you may appeal to the  Department of Administration, . You must file your appeal within 30 days from the date of the  decision  letter at [address], [address], [place], [place] [address], or [address] [facility] [address], [place] [address]. If you  disagree with a decision of an , you may file a petition for judicial review with the District Court. You must do so within 30  days of the Appeal Officer’s decision. You may be represented by an  at your own expense or you may contact the [facility] for possible  representation.    [place]  for Injured Workers (NAIW): If you disagree with a  decision, you may request that NAIW represent you  without charge at an  Hearing. For information regarding denial of benefits, you may contact the [facility] at: [address], [address], [place] [address], [phone], or [address], [place] [address], [phone]    To File a Complaint with the Division: If you wish to  file a complaint with the  of the Division of Industrial Relations (DIR),  please contact the Workers’ Compensation Section, [address], [address], [place], [place] [address], telephone ([phone]) [phone], or  [address], [address], [place], [place] [address], telephone [phone].    For assistance with Workers’ Compensation Issues: you may contact the Office of the Governor Consumer Health Assistance, [address] [place] [address], [address],  [address], Toll Free 1-246.933.3097, Web site: http://govcha.[facility].nv., E-mail  [email or web address].[email or web address].                                                                                                                                                                                                                                   __________________________________________________________________                                                                   _________________                Employee Name / Signature                                                                                                                                                       Date                                                                                                                                                                                                     D-2 (rev. 10/07)

## 2019-02-25 ENCOUNTER — ANTICOAGULATION MONITORING (OUTPATIENT)
Dept: MEDICAL GROUP | Facility: MEDICAL CENTER | Age: 54
End: 2019-02-25

## 2019-02-25 DIAGNOSIS — I48.91 ATRIAL FIBRILLATION WITH RAPID VENTRICULAR RESPONSE (HCC): ICD-10-CM

## 2019-02-25 LAB
INR PPP: 3.3 (ref 0.8–1.2)
INR PPP: 3.3 (ref 2–3.5)
PROTHROMBIN TIME: 34.8 SEC (ref 9.1–12)

## 2019-02-26 NOTE — PROGRESS NOTES
Anticoagulation Summary  As of 2/25/2019    INR goal:   2.0-3.0   TTR:   49.0 % (6.8 mo)   INR used for dosing:   3.3! (2/25/2019)   Warfarin maintenance plan:   7.5 mg (5 mg x 1.5) every Sun, Tue, Thu; 5 mg (5 mg x 1) all other days   Weekly warfarin total:   42.5 mg   Plan last modified:   JODY Brandt (2/25/2019)   Next INR check:   3/4/2019   Target end date:   Indefinite    Indications    Atrial fibrillation with rapid ventricular response (HCC) [I48.91]             Anticoagulation Episode Summary     INR check location:       Preferred lab:       Send INR reminders to:       Comments:   self pay patient      Anticoagulation Care Providers     Provider Role Specialty Phone number    Ruel Duran M.D. Referring Internal Medicine [phone]    [facility] Responsible  [phone]    Shashi Lawler, PharmD Responsible          Anticoagulation Patient Findings        Spoke with patient by phone. Taking Lysine and started on a few new meds that can cause the INR to go up Diclofenac (which can)  And Methocarbamol (which should not) cause an increase in INR       INR  supra-therapeutic.    Changes to current medical/health status since last appt: none.   Denies signs/symptoms of bleeding and/or thrombosis since the last appt.   Denies any interval changes to diet  Denies any interval changes to medications since last appt.   Denies any complications or cost restrictions with current therapy  Follow up appointment in 1 week(s).      Decrease dose to 2.5 mg tonight then decrease by 10.5% and test next Monday.  The patient is on a high risk medication and is supra-therapeudic. This increases the patients risk of bleeding and therefore requires frequent monitoring and follow up.          CHEST guidelines recommend frequent INR monitoring at regular intervals (a few days up to a max of 12 weeks) to ensure they are on the proper dose of warfarin and not having any complications from  therapy.  INRs can dramatically change over a short time period due to diet, medications, and medical conditions.        DILEEP Brandt.

## 2019-02-27 ENCOUNTER — TELEPHONE (OUTPATIENT)
Dept: CARDIOLOGY | Facility: MEDICAL CENTER | Age: 54
End: 2019-02-27

## 2019-02-27 NOTE — TELEPHONE ENCOUNTER
med advice   Received: Today   Message Contents   Maira Granados R.N.   Phone Number: [phone]             HERACLIO/cinthia     Pt calling to report she slipped & fell & [employer] wants pt to do lidocaine patches and muscle relaxer.     Pt wants to have approval to follow this regimen due to taking cardiac meds.  Pt is available to receive your call tomorrow morning 8-10am, [phone].        To DB - Patient recently prescribed muscle relaxer (flexeril) and use topical lidocaine patch.  Ok to take? Patient only on amiodarone and warfarin.

## 2019-03-04 ENCOUNTER — TELEPHONE (OUTPATIENT)
Dept: CARDIOLOGY | Facility: MEDICAL CENTER | Age: 54
End: 2019-03-04

## 2019-03-04 ENCOUNTER — ANTICOAGULATION MONITORING (OUTPATIENT)
Dept: VASCULAR LAB | Facility: MEDICAL CENTER | Age: 54
End: 2019-03-04

## 2019-03-04 DIAGNOSIS — I48.91 ATRIAL FIBRILLATION WITH RAPID VENTRICULAR RESPONSE (HCC): ICD-10-CM

## 2019-03-04 LAB
INR PPP: 1.9 (ref 0.8–1.2)
INR PPP: 1.9 (ref 2–3.5)
PROTHROMBIN TIME: 19.7 SEC (ref 9.1–12)

## 2019-03-04 NOTE — TELEPHONE ENCOUNTER
"heart beat erratic, lightheaded,   Received: Today   Message Contents   Maira Granados R.N.   Phone Number: [phone]             DB/Ana     Pt calling to report symptoms within the last hour, heart beat is fluctuating, erratic, beats are different than usual, lightheaded, please call Corinne [phone]      Contacted patient, she states \"she's ok, but has some heart fluctuations.  Patient does not check her pulse.  Cannot confirm if patient's HR is fast, but c/o palpitations or \"jumping\" of her heart rate.      Denies CP, arm/jaw pain, weight gain.  C/o some light headedness.  Denies SOB.     Patient is requesting an earlier appt with SS.  Will request patient be placed on waiting list for an earlier opening.  In meantime, FV with DB was made.     Task to scheduling    "

## 2019-03-05 NOTE — PROGRESS NOTES
"Anticoagulation Summary  As of 3/4/2019    INR goal:   2.0-3.0   TTR:   49.8 % (7 mo)   INR used for dosin.9! (3/4/2019)   Warfarin maintenance plan:   7.5 mg (5 mg x 1.5) every Sun, Tue, Thu; 5 mg (5 mg x 1) all other days   Weekly warfarin total:   42.5 mg   Plan last modified:   JODY Brandt (2019)   Next INR check:   3/18/2019   Target end date:   Indefinite    Indications    Atrial fibrillation with rapid ventricular response (HCC) [I48.91]             Anticoagulation Episode Summary     INR check location:       Preferred lab:       Send INR reminders to:       Comments:   self pay patient      Anticoagulation Care Providers     Provider Role Specialty Phone number    Ruel Duran M.D. Referring Internal Medicine [phone]    [facility] Responsible  [phone]    Shashi Lawler, PharmD Responsible          Anticoagulation Patient Findings  Patient Findings     Negatives:   Signs/symptoms of thrombosis, Signs/symptoms of bleeding, Laboratory test error suspected, Change in health, Change in alcohol use, Change in activity, Upcoming invasive procedure, Emergency department visit, Upcoming dental procedure, Missed doses, Extra doses, Change in medications, Change in diet/appetite, Hospital admission, Bruising, Other complaints        Spoke with the patient on the phone today, reporting a slightly SUB-therapeutic INR of 1.9.  Confirmed the current warfarin dosing regimen and patient compliance. Patient cut her finger on Friday and so SELF HELD her dose that night. Patient reports feeling her heart \"beating funny\" in her chest and has f/u with cardiology on Thursday. Will have patient bolus with 7.5mg TONIGHT as a boost, then resume her current dosing regimen. Patient will follow up again in 2 weeks.     Eliud Crews  PharmD    "

## 2019-03-19 ENCOUNTER — ANTICOAGULATION MONITORING (OUTPATIENT)
Dept: VASCULAR LAB | Facility: MEDICAL CENTER | Age: 54
End: 2019-03-19

## 2019-03-19 DIAGNOSIS — I48.91 ATRIAL FIBRILLATION WITH RAPID VENTRICULAR RESPONSE (HCC): ICD-10-CM

## 2019-03-19 LAB
INR PPP: 2.9 (ref 0.8–1.2)
PROTHROMBIN TIME: 29.4 SEC (ref 9.1–12)

## 2019-03-19 NOTE — PROGRESS NOTES
Anticoagulation Summary  As of 3/19/2019    INR goal:   2.0-3.0   TTR:   52.4 % (7.5 mo)   INR used for dosin.9 (3/18/2019)   Warfarin maintenance plan:   7.5 mg (5 mg x 1.5) every Sun, Tue, Thu; 5 mg (5 mg x 1) all other days   Weekly warfarin total:   42.5 mg   Plan last modified:   ERENDIRA BrandtPZariaNZaria (2019)   Next INR check:   2019   Target end date:   Indefinite    Indications    Atrial fibrillation with rapid ventricular response (HCC) [I48.91]             Anticoagulation Episode Summary     INR check location:       Preferred lab:       Send INR reminders to:       Comments:   self pay patient      Anticoagulation Care Providers     Provider Role Specialty Phone number    Rule Duran M.D. Referring Internal Medicine [phone]    [facility] Responsible  [phone]    Shashi Lawler, PharmD Responsible          Anticoagulation Patient Findings     Left message for patient to continue current dose of warfarin and retest INR in two weeks.    Itzel Fountain, Pharmacy Intern

## 2019-04-15 ENCOUNTER — TELEPHONE (OUTPATIENT)
Dept: VASCULAR LAB | Facility: MEDICAL CENTER | Age: 54
End: 2019-04-15

## 2019-04-16 ENCOUNTER — ANTICOAGULATION MONITORING (OUTPATIENT)
Dept: VASCULAR LAB | Facility: MEDICAL CENTER | Age: 54
End: 2019-04-16

## 2019-04-16 DIAGNOSIS — I48.91 ATRIAL FIBRILLATION WITH RAPID VENTRICULAR RESPONSE (HCC): ICD-10-CM

## 2019-04-16 LAB
INR PPP: 2.7 (ref 0.8–1.2)
PROTHROMBIN TIME: 27.9 SEC (ref 9.1–12)

## 2019-04-16 NOTE — PROGRESS NOTES
Anticoagulation Summary  As of 2019    INR goal:   2.0-3.0   TTR:   57.7 % (8.4 mo)   INR used for dosin.7 (4/15/2019)   Warfarin maintenance plan:   7.5 mg (5 mg x 1.5) every Sun, Tue, Thu; 5 mg (5 mg x 1) all other days   Weekly warfarin total:   42.5 mg   Plan last modified:   JODY Brandt (2019)   Next INR check:   2019   Target end date:   Indefinite    Indications    Atrial fibrillation with rapid ventricular response (HCC) [I48.91]             Anticoagulation Episode Summary     INR check location:       Preferred lab:       Send INR reminders to:       Comments:   self pay patient      Anticoagulation Care Providers     Provider Role Specialty Phone number    Ruel Duran M.D. Referring Internal Medicine [phone]    [facility] Responsible  [phone]    Shashi Lawler, PharmD Responsible          Anticoagulation Patient Findings    Left a message on patients voicemail to report a therapeutic INR of 2.7.    Instructed patient to contact clinic with any changes to current medications, diet, or if experiencing any unusual s/sx of bleeding, bruising, or clotting.     Patient to continue with current warfarin dosing regimen as stated above. Follow-up INR in 2 weeks, .    Itzel Fountain, Pharmacy Intern

## 2019-04-29 ENCOUNTER — OFFICE VISIT (OUTPATIENT)
Dept: CARDIOLOGY | Facility: MEDICAL CENTER | Age: 54
End: 2019-04-29

## 2019-04-29 VITALS
HEART RATE: 66 BPM | DIASTOLIC BLOOD PRESSURE: 80 MMHG | WEIGHT: 201 LBS | BODY MASS INDEX: 33.49 KG/M2 | OXYGEN SATURATION: 96 % | SYSTOLIC BLOOD PRESSURE: 120 MMHG | HEIGHT: 65 IN

## 2019-04-29 DIAGNOSIS — Z79.899 LONG TERM CURRENT USE OF AMIODARONE: ICD-10-CM

## 2019-04-29 DIAGNOSIS — I48.91 ATRIAL FIBRILLATION, UNSPECIFIED TYPE (HCC): ICD-10-CM

## 2019-04-29 DIAGNOSIS — Z79.01 CHRONIC ANTICOAGULATION: ICD-10-CM

## 2019-04-29 PROCEDURE — 93000 ELECTROCARDIOGRAM COMPLETE: CPT | Performed by: INTERNAL MEDICINE

## 2019-04-29 PROCEDURE — 99214 OFFICE O/P EST MOD 30 MIN: CPT | Performed by: INTERNAL MEDICINE

## 2019-04-29 NOTE — PROGRESS NOTES
"Arrhythmia Clinic Note (New patient)     DOS: 4/29/2019    Referring physician: Margo Oneill    Chief complaint/Reason for consult: AF    HPI:  Patient is a 52 yo F. She has no significant PMH other than a bout of AF she experienced last July. Started having palpitations and then found to be in rapid AF and admitted to regional. She underwent amiodarone load subsequently converted to sinus. Since that admission she has been on amiodarone and warfarin. Has had difficult time maintaining therapeutic INR with warfarin. Says she \"hates warfarin\". Otherwise intermittent palpitations lasting a few seconds but nothing like she had before.    ROS (+ highlighted in red):  Constitutional: Fevers/chills/fatigue/weightloss  HEENT: Blurry vision/eye pain/sore throat/hearing loss  Respiratory: Shortness of breath/cough  Cardiovascular: Chest pain/palpitations/edema/orthopnea/syncope  GI: Nausea/vomitting/diarrhea  MSK: Arthralgias/myagias/muscle weakness  Skin: Rash/sores  Neurological: Numbness/tremors/vertigo  Endocrine: Excessive thirst/polyuria/cold intolerance/heat intolerance  Psych: Depression/anxiety    Past Medical History:   Diagnosis Date   • Arthritis    • Back pain        Past Surgical History:   Procedure Laterality Date   • TONSILLECTOMY  1969       Social History     Social History   • Marital status:      Spouse name: N/A   • Number of children: N/A   • Years of education: N/A     Occupational History   • Not on file.     Social History Main Topics   • Smoking status: Never Smoker   • Smokeless tobacco: Never Used   • Alcohol use No      Comment: occasional   • Drug use: No   • Sexual activity: Yes     Partners: Male      Comment: vasectomy     Other Topics Concern   • Not on file     Social History Narrative   • No narrative on file       Family History   Problem Relation Age of Onset   • Cancer Mother         Breast Cancer       Allergies   Allergen Reactions   • Pcn [Penicillins] Hives       Current " "Outpatient Prescriptions   Medication Sig Dispense Refill   • warfarin (COUMADIN) 5 MG Tab TAKE 1 & 1/2 (ONE & ONE-HALF) TABLETS BY MOUTH ONCE DAILY 45 Tab 2   • amiodarone (CORDARONE) 200 MG Tab Take 1 Tab by mouth every day. 30 Tab 11   • Cholecalciferol (VITAMIN D3 PO) Take 1 Cap by mouth every day.     • acetaminophen (TYLENOL) 500 MG Tab Take 1-2 Tabs by mouth every 6 hours as needed. (Patient not taking: Reported on 4/29/2019) 30 Tab 0   • cyclobenzaprine (FLEXERIL) 10 MG Tab Take 1 Tab by mouth 3 times a day as needed. (Patient not taking: Reported on 4/29/2019) 30 Tab 0   • zinc sulfate (ZINCATE) 220 (50 Zn) MG Cap Take 220 mg by mouth every day.     • ascorbic acid (ASCORBIC ACID) 500 MG Tab Take 500 mg by mouth every day.     • therapeutic multivitamin-minerals (THERAGRAN-M) Tab Take 1 Tab by mouth every day.     • Methylsulfonylmethane (MSM) Powder Take 10 mL by mouth every day.       No current facility-administered medications for this visit.        Physical Exam:  Vitals:    04/29/19 1612   BP: 120/80   BP Location: Left arm   Patient Position: Sitting   BP Cuff Size: Adult   Pulse: 66   SpO2: 96%   Weight: 91.2 kg (201 lb)   Height: 1.651 m (5' 5\")     General appearance: NAD, conversant   Eyes: anicteric sclerae, moist conjunctivae; no lid-lag; PERRLA  HENT: Atraumatic; oropharynx clear with moist mucous membranes and no mucosal ulcerations; normal hard and soft palate  Neck: Trachea midline; FROM, supple, no thyromegaly or lymphadenopathy  Lungs: CTA, with normal respiratory effort and no intercostal retractions  CV: RRR, no MRGs, no JVD   Abdomen: Soft, non-tender; no masses or HSM  Extremities: No peripheral edema or extremity lymphadenopathy  Skin: Normal temperature, turgor and texture; no rash, ulcers or subcutaneous nodules  Psych: Appropriate affect, alert and oriented to person, place and time    Data:  Most recent labs reviewed    Prior echo/stress results reviewed:  LVEF 65%    EKG " interpreted by me:  NSR    Impression/Plan:  1. Atrial fibrillation, unspecified type (HCC)  EKG   2. Long term current use of amiodarone     3. Chronic anticoagulation       -Her CHADSVasc is 1 for being F, no concurrent risk factors  -I will stop her OAC  -I think she is way to young for chronic amiodarone  -I will stop the amiodarone  -Perfusion scan to exclude scar/ischemia  -If none, would consider IC agent  -F/u after stress    Tyree Her MD

## 2019-04-30 LAB — EKG IMPRESSION: NORMAL

## 2019-07-25 ENCOUNTER — ANTICOAGULATION MONITORING (OUTPATIENT)
Dept: VASCULAR LAB | Facility: MEDICAL CENTER | Age: 54
End: 2019-07-25

## 2019-07-25 DIAGNOSIS — I48.91 ATRIAL FIBRILLATION WITH RAPID VENTRICULAR RESPONSE (HCC): ICD-10-CM

## 2019-08-14 DIAGNOSIS — I48.91 ATRIAL FIBRILLATION WITH RAPID VENTRICULAR RESPONSE (HCC): ICD-10-CM

## 2019-08-16 ENCOUNTER — TELEPHONE (OUTPATIENT)
Dept: CARDIOLOGY | Facility: MEDICAL CENTER | Age: 54
End: 2019-08-16

## 2019-08-16 DIAGNOSIS — I48.91 ATRIAL FIBRILLATION, UNSPECIFIED TYPE (HCC): ICD-10-CM

## 2019-08-16 NOTE — TELEPHONE ENCOUNTER
"Patient called, transferred by .  Patient frustrated because this RN did not refill her Amiodarone.  Per Dr. Her's last office visit note, he discontinued amiodarone due to patient's age - should not be on chronic amiodarone therapy.  Her OAC was also discontinued at this time.     Explained this to patient.  Patient states the MPI Dr. Her ordered she has not completed yet and she cannot afford as this is going to cost her $3,000.  If she does not take Amiodarone she \"will convert back into Afib, so what am I suppose to do?\".      Explained will fwd message to  and his nurse    Rx refill request also forwarded to SS to refill or refuse        To SS to advise  To 's nurse  "

## 2019-08-21 RX ORDER — AMIODARONE HYDROCHLORIDE 200 MG/1
TABLET ORAL
Qty: 30 TAB | Refills: 10 | Status: SHIPPED | OUTPATIENT
Start: 2019-08-21 | End: 2020-05-26 | Stop reason: SDUPTHER

## 2019-08-22 NOTE — TELEPHONE ENCOUNTER
Tyree Her M.D.  Ana Granados R.N.; Luzmaria Anderson L.P.N. 5 hours ago (12:14 PM)      Maybe can just do treadmill and then if negative for ischemia change her over to flecainide.      LVM at [phone] requesting call back to discuss  recommendations.

## 2019-08-23 NOTE — TELEPHONE ENCOUNTER
Pt called back. Discussed recommendation for treadmill stress test and possible switch to Flecainide if results are negative for ischemia. Pt agreeable to test and verbalized understanding.    Order placed for treadmill stress test.

## 2019-09-16 ENCOUNTER — NON-PROVIDER VISIT (OUTPATIENT)
Dept: CARDIOLOGY | Facility: MEDICAL CENTER | Age: 54
End: 2019-09-16

## 2019-09-16 VITALS
OXYGEN SATURATION: 99 % | SYSTOLIC BLOOD PRESSURE: 130 MMHG | BODY MASS INDEX: 33.49 KG/M2 | WEIGHT: 201 LBS | HEIGHT: 65 IN | DIASTOLIC BLOOD PRESSURE: 78 MMHG | HEART RATE: 56 BPM

## 2019-09-16 DIAGNOSIS — I48.91 ATRIAL FIBRILLATION, UNSPECIFIED TYPE (HCC): ICD-10-CM

## 2019-09-16 LAB — TREADMILL STRESS: NORMAL

## 2019-09-16 PROCEDURE — 93015 CV STRESS TEST SUPVJ I&R: CPT | Performed by: INTERNAL MEDICINE

## 2019-09-27 ENCOUNTER — TELEPHONE (OUTPATIENT)
Dept: CARDIOLOGY | Facility: MEDICAL CENTER | Age: 54
End: 2019-09-27

## 2020-01-23 ENCOUNTER — TELEPHONE (OUTPATIENT)
Dept: CARDIOLOGY | Facility: MEDICAL CENTER | Age: 55
End: 2020-01-23

## 2020-01-24 NOTE — TELEPHONE ENCOUNTER
Faxed dental clearance ([phone])    to Dr. Alvin Porras DDS, stating that, per ADA guidelines, pt. Has no cardiac dx. That would necessitate antibiotic prophylaxis.

## 2020-05-26 DIAGNOSIS — I48.91 ATRIAL FIBRILLATION WITH RAPID VENTRICULAR RESPONSE (HCC): ICD-10-CM

## 2020-05-26 RX ORDER — AMIODARONE HYDROCHLORIDE 200 MG/1
200 TABLET ORAL DAILY
Qty: 90 TAB | Refills: 0 | Status: SHIPPED | OUTPATIENT
Start: 2020-05-26 | End: 2020-06-08

## 2020-06-08 ENCOUNTER — OFFICE VISIT (OUTPATIENT)
Dept: CARDIOLOGY | Facility: MEDICAL CENTER | Age: 55
End: 2020-06-08
Payer: COMMERCIAL

## 2020-06-08 VITALS
HEART RATE: 60 BPM | OXYGEN SATURATION: 95 % | HEIGHT: 65 IN | WEIGHT: 204 LBS | SYSTOLIC BLOOD PRESSURE: 124 MMHG | BODY MASS INDEX: 33.99 KG/M2 | DIASTOLIC BLOOD PRESSURE: 70 MMHG

## 2020-06-08 DIAGNOSIS — Z01.810 PREOPERATIVE CARDIOVASCULAR EXAMINATION: ICD-10-CM

## 2020-06-08 DIAGNOSIS — I48.0 PAF (PAROXYSMAL ATRIAL FIBRILLATION) (HCC): ICD-10-CM

## 2020-06-08 PROCEDURE — 93000 ELECTROCARDIOGRAM COMPLETE: CPT | Performed by: INTERNAL MEDICINE

## 2020-06-08 PROCEDURE — 99214 OFFICE O/P EST MOD 30 MIN: CPT | Performed by: INTERNAL MEDICINE

## 2020-06-08 RX ORDER — FLECAINIDE ACETATE 50 MG/1
50 TABLET ORAL 2 TIMES DAILY
Qty: 60 TAB | Refills: 5 | Status: SHIPPED | OUTPATIENT
Start: 2020-06-08 | End: 2020-06-11 | Stop reason: SDUPTHER

## 2020-06-08 ASSESSMENT — FIBROSIS 4 INDEX: FIB4 SCORE: 0.98

## 2020-06-08 NOTE — PROGRESS NOTES
Arrhythmia Clinic Note (Established patient)    DOS: 6/8/2020    Chief complaint/Reason for consult: F/u PAF    Interval History:  Patient is a 53 yo. History of symptomatic paroxysmal AF. In the past started on amiodarone. We had done ETT to evaluate her for IC drug. ETT was normal. We had difficulty leaving her a message to switch to the IC alternative. Still on amiodarone. Recently had a fall and need surgery for meniscus tear. Here for follow-up and pre-operative clearance. Off any OAC. No complaints today other than she has gained some weight due to lack of exercise.    ROS (+ BOLD):  General--fatigue, weight loss or weight gain  Cardiovascular--CP, orthopnea, PND    Past Medical History:   Diagnosis Date   • Arthritis    • Back pain        Past Surgical History:   Procedure Laterality Date   • TONSILLECTOMY  1969       Social History     Socioeconomic History   • Marital status:      Spouse name: Not on file   • Number of children: Not on file   • Years of education: Not on file   • Highest education level: Not on file   Occupational History   • Not on file   Social Needs   • Financial resource strain: Not on file   • Food insecurity     Worry: Not on file     Inability: Not on file   • Transportation needs     Medical: Not on file     Non-medical: Not on file   Tobacco Use   • Smoking status: Never Smoker   • Smokeless tobacco: Never Used   Substance and Sexual Activity   • Alcohol use: No     Comment: occasional   • Drug use: No   • Sexual activity: Yes     Partners: Male     Comment: vasectomy   Lifestyle   • Physical activity     Days per week: Not on file     Minutes per session: Not on file   • Stress: Not on file   Relationships   • Social connections     Talks on phone: Not on file     Gets together: Not on file     Attends Mormon service: Not on file     Active member of club or organization: Not on file     Attends meetings of clubs or organizations: Not on file     Relationship status: Not  "on file   • Intimate partner violence     Fear of current or ex partner: Not on file     Emotionally abused: Not on file     Physically abused: Not on file     Forced sexual activity: Not on file   Other Topics Concern   • Not on file   Social History Narrative   • Not on file       Family History   Problem Relation Age of Onset   • Cancer Mother         Breast Cancer       Allergies   Allergen Reactions   • Pcn [Penicillins] Hives       Current Outpatient Medications   Medication Sig Dispense Refill   • EVENING PRIMROSE OIL PO Take  by mouth.     • flecainide (TAMBOCOR) 50 MG tablet Take 1 Tab by mouth 2 times a day. 60 Tab 5   • acetaminophen (TYLENOL) 500 MG Tab Take 1-2 Tabs by mouth every 6 hours as needed. 30 Tab 0   • zinc sulfate (ZINCATE) 220 (50 Zn) MG Cap Take 220 mg by mouth every day.     • ascorbic acid (ASCORBIC ACID) 500 MG Tab Take 500 mg by mouth every day.     • Cholecalciferol (VITAMIN D3 PO) Take 1 Cap by mouth every day.     • Methylsulfonylmethane (MSM) Powder Take 10 mL by mouth every day.     • cyclobenzaprine (FLEXERIL) 10 MG Tab Take 1 Tab by mouth 3 times a day as needed. (Patient not taking: Reported on 4/29/2019) 30 Tab 0   • therapeutic multivitamin-minerals (THERAGRAN-M) Tab Take 1 Tab by mouth every day.       No current facility-administered medications for this visit.        Physical Exam:  Vitals:    06/08/20 1003   BP: 124/70   BP Location: Left arm   Patient Position: Sitting   BP Cuff Size: Large adult   Pulse: 60   SpO2: 95%   Weight: 92.5 kg (204 lb)   Height: 1.651 m (5' 5\")     General appearance: NAD, conversant  HEENT: PERRL, neck is supple with FROM  Lungs: Clear to auscultation, normal respiratory effort  CV: RRR, no murmurs/rubs/gallops, no JVD  Abdomen: Soft, non-tender with normal bowel sounds  Extremities: No peripheral edema, no clubbing or cyanosis  Skin: No rash, lesions, or ulcers  Psych: Alert and oriented to person, place and time    Data:  Labs " reviewed    Prior echo/stress reviewed:  ETT wnl  Preserved function on echo in 2018    EKG interpreted by me:  NSR    Impression/Plan:  1. PAF (paroxysmal atrial fibrillation) ([facility])  EKG   2. Preoperative cardiovascular examination       -She is cleared for orthopedic surgery from my standpoint without further cardiac w/u  -Stop amiodarone  -Will switch to IC agent (flecainide)  -F/u in 6 mo    Tyree Her MD

## 2020-06-11 ENCOUNTER — TELEPHONE (OUTPATIENT)
Dept: CARDIOLOGY | Facility: MEDICAL CENTER | Age: 55
End: 2020-06-11

## 2020-06-11 RX ORDER — FLECAINIDE ACETATE 50 MG/1
50 TABLET ORAL 2 TIMES DAILY
Qty: 60 TAB | Refills: 5 | Status: SHIPPED
Start: 2020-06-11 | End: 2021-05-17

## 2020-06-11 NOTE — TELEPHONE ENCOUNTER
SS pt would like her flecainide Rx sent to [facility] in [place], she says she plans to go there today & would like to pick it up.

## 2020-06-17 LAB — EKG IMPRESSION: NORMAL

## 2020-09-04 ENCOUNTER — OFFICE VISIT (OUTPATIENT)
Dept: CARDIOLOGY | Facility: MEDICAL CENTER | Age: 55
End: 2020-09-04
Payer: COMMERCIAL

## 2020-09-04 VITALS
BODY MASS INDEX: 36.32 KG/M2 | DIASTOLIC BLOOD PRESSURE: 82 MMHG | OXYGEN SATURATION: 97 % | WEIGHT: 218 LBS | HEART RATE: 71 BPM | SYSTOLIC BLOOD PRESSURE: 124 MMHG | HEIGHT: 65 IN

## 2020-09-04 DIAGNOSIS — Z51.81 ENCOUNTER FOR MONITORING FLECAINIDE THERAPY: ICD-10-CM

## 2020-09-04 DIAGNOSIS — I48.0 PAF (PAROXYSMAL ATRIAL FIBRILLATION) (HCC): ICD-10-CM

## 2020-09-04 DIAGNOSIS — Z79.899 ENCOUNTER FOR MONITORING FLECAINIDE THERAPY: ICD-10-CM

## 2020-09-04 LAB — EKG IMPRESSION: NORMAL

## 2020-09-04 PROCEDURE — 93000 ELECTROCARDIOGRAM COMPLETE: CPT | Performed by: INTERNAL MEDICINE

## 2020-09-04 PROCEDURE — 99214 OFFICE O/P EST MOD 30 MIN: CPT | Performed by: INTERNAL MEDICINE

## 2020-09-04 RX ORDER — FLECAINIDE ACETATE 100 MG/1
100 TABLET ORAL 2 TIMES DAILY
Qty: 60 TAB | Refills: 5 | Status: SHIPPED | OUTPATIENT
Start: 2020-09-04 | End: 2021-03-01

## 2020-09-04 RX ORDER — MULTIVIT WITH MINERALS/LUTEIN
1 TABLET ORAL DAILY
COMMUNITY

## 2020-09-04 NOTE — PROGRESS NOTES
Arrhythmia Clinic Note (Established patient)    DOS: 9/4/2020    Chief complaint/Reason for consult: PAF    Interval History:  Patient is a 56 yo F with history of PAF. Previously had been on amiodarone. Underwent cardiac evaluation including stress testing and we switched her over to a IC agent. She says she has not been feeling as well on the flecainide and she feels like she is having AF breakthrough, palpitations/head fullness, sometimes multiple episodes a day, lasting minutes. She also wonders if flec could be contributing to weight gain. Though admittedly since recent R knee surgery she has not been active and noted some fluid retention in the leg.    ROS (+ in BOLD):  General--fatigue, weight loss or weight gain  Cardiovascular--CP, orthopnea, PND    Past Medical History:   Diagnosis Date   • Arthritis    • Back pain        Past Surgical History:   Procedure Laterality Date   • TONSILLECTOMY  1969       Social History     Socioeconomic History   • Marital status:      Spouse name: Not on file   • Number of children: Not on file   • Years of education: Not on file   • Highest education level: Not on file   Occupational History   • Not on file   Social Needs   • Financial resource strain: Not on file   • Food insecurity     Worry: Not on file     Inability: Not on file   • Transportation needs     Medical: Not on file     Non-medical: Not on file   Tobacco Use   • Smoking status: Never Smoker   • Smokeless tobacco: Never Used   Substance and Sexual Activity   • Alcohol use: No     Comment: occasional   • Drug use: No   • Sexual activity: Yes     Partners: Male     Comment: vasectomy   Lifestyle   • Physical activity     Days per week: Not on file     Minutes per session: Not on file   • Stress: Not on file   Relationships   • Social connections     Talks on phone: Not on file     Gets together: Not on file     Attends Zoroastrian service: Not on file     Active member of club or organization: Not on file  "    Attends meetings of clubs or organizations: Not on file     Relationship status: Not on file   • Intimate partner violence     Fear of current or ex partner: Not on file     Emotionally abused: Not on file     Physically abused: Not on file     Forced sexual activity: Not on file   Other Topics Concern   • Not on file   Social History Narrative   • Not on file       Family History   Problem Relation Age of Onset   • Cancer Mother         Breast Cancer       Allergies   Allergen Reactions   • Pcn [Penicillins] Hives       Current Outpatient Medications   Medication Sig Dispense Refill   • vitamin E (VITAMIN E) 1000 UNIT Cap Take 1 Cap by mouth every day.     • Alpha-Lipoic Acid 100 MG Cap Take  by mouth.     • flecainide (TAMBOCOR) 50 MG tablet Take 1 Tab by mouth 2 times a day. 60 Tab 5   • EVENING PRIMROSE OIL PO Take  by mouth.     • acetaminophen (TYLENOL) 500 MG Tab Take 1-2 Tabs by mouth every 6 hours as needed. 30 Tab 0   • zinc sulfate (ZINCATE) 220 (50 Zn) MG Cap Take 220 mg by mouth every day. Zinc with copper     • ascorbic acid (ASCORBIC ACID) 500 MG Tab Take 500 mg by mouth every day.     • Cholecalciferol (VITAMIN D3 PO) Take 1 Cap by mouth every day.     • cyclobenzaprine (FLEXERIL) 10 MG Tab Take 1 Tab by mouth 3 times a day as needed. (Patient not taking: Reported on 4/29/2019) 30 Tab 0   • therapeutic multivitamin-minerals (THERAGRAN-M) Tab Take 1 Tab by mouth every day.     • Methylsulfonylmethane (MSM) Powder Take 10 mL by mouth every day.       No current facility-administered medications for this visit.        Physical Exam:  Vitals:    09/04/20 1343   BP: 124/82   BP Location: Left arm   Patient Position: Sitting   BP Cuff Size: Adult   Pulse: 71   SpO2: 97%   Weight: 98.9 kg (218 lb)   Height: 1.651 m (5' 5\")     General appearance: NAD, conversant  HEENT: PERRL, neck is supple with FROM  Lungs: Clear to auscultation, normal respiratory effort  CV: RRR, no murmurs/rubs/gallops, no " JVD  Abdomen: Soft, non-tender with normal bowel sounds  Extremities: No peripheral edema, no clubbing or cyanosis  Skin: No rash, lesions, or ulcers  Psych: Alert and oriented to person, place and time    Data:  Labs reviewed    Prior echo/stress reviewed:  LVEF 65%    EKG interpreted by me:  Sinus rhythm    Impression/Plan:  1. PAF (paroxysmal atrial fibrillation) ([facility])  EKG   2. Encounter for monitoring flecainide therapy       -I discussed options for breakthrough on flecainide therapy including alternative antiarrhythmic vs RF ablation  -I also reassured her that I did not think the fluid rentention was due to HF as prior BNP and echo was normal, but did offer repeat echo although they declined on proceeding with this for now  -We are going to double the flecainide dosage for now to see how she does    Tyree Her MD

## 2020-12-28 ENCOUNTER — TELEPHONE (OUTPATIENT)
Dept: CARDIOLOGY | Facility: MEDICAL CENTER | Age: 55
End: 2020-12-28

## 2020-12-28 NOTE — TELEPHONE ENCOUNTER
SS    NM: Corinne Snyder   PH: [phone]   PT NM: Snyder, Corinne   : 1965   REG DR: Dr Her   RE: Feet and ankles are swollen   DISP HIST: 2020 12:24P VW p/disp  2020 12:25P DSC chkd    --------------------------------------  Message History  Account: [id number]  Taken:  Mon 28-Dec-2020 12:25p VW  Serial#: [id number]    Thank you,  Sarah IZAGUIRRE

## 2020-12-29 NOTE — TELEPHONE ENCOUNTER
Returned call/ pt c/o Swelling bilateral mid calf  Did report increase in salty food inatke  Pt feels like medication not working that well, feels like she has had more afib episodes recently.  Has not been exercising.     Since last visit, increasing flec has lost its effect. Pt has f/u visit tomorrow with SS. Advise until then, hydrate 64oz/day, decrease salt intake <2000mg, elevate legs, try compression socks, and exercise.     Discuss SS suggestion of Echo in last visit. And to discuss with him at tomorrows visit

## 2020-12-30 ENCOUNTER — OFFICE VISIT (OUTPATIENT)
Dept: CARDIOLOGY | Facility: MEDICAL CENTER | Age: 55
End: 2020-12-30

## 2020-12-30 VITALS
OXYGEN SATURATION: 95 % | BODY MASS INDEX: 37.82 KG/M2 | HEART RATE: 100 BPM | DIASTOLIC BLOOD PRESSURE: 84 MMHG | HEIGHT: 65 IN | WEIGHT: 227 LBS | SYSTOLIC BLOOD PRESSURE: 138 MMHG

## 2020-12-30 DIAGNOSIS — Z51.81 ENCOUNTER FOR MONITORING FLECAINIDE THERAPY: ICD-10-CM

## 2020-12-30 DIAGNOSIS — I48.0 PAF (PAROXYSMAL ATRIAL FIBRILLATION) (HCC): ICD-10-CM

## 2020-12-30 DIAGNOSIS — Z79.899 ENCOUNTER FOR MONITORING FLECAINIDE THERAPY: ICD-10-CM

## 2020-12-30 DIAGNOSIS — R60.0 LOWER EXTREMITY EDEMA: ICD-10-CM

## 2020-12-30 PROCEDURE — 93000 ELECTROCARDIOGRAM COMPLETE: CPT | Performed by: INTERNAL MEDICINE

## 2020-12-30 PROCEDURE — 99214 OFFICE O/P EST MOD 30 MIN: CPT | Performed by: INTERNAL MEDICINE

## 2020-12-30 RX ORDER — FUROSEMIDE 20 MG/1
20 TABLET ORAL DAILY
Qty: 30 TAB | Refills: 3 | Status: SHIPPED
Start: 2020-12-30 | End: 2021-05-17

## 2020-12-30 NOTE — PROGRESS NOTES
Arrhythmia Clinic Note (Established patient)    DOS: 12/30/2020    Chief complaint/Reason for consult: F/u PAF    Interval History:  Patient is a 54 yo F. History of PAF. Originally on amiodarone we switched her to flecainide. She thinks the flecainide is not working well. 2-3x will have breakthrough palpations lasting 10-20 minutes a day. Also increasing lower extremity edema.    ROS (+in BOLD):  General-- fatigue, weight loss or weight gain  Cardiovascular-- CP, orthopnea, PND, edema    Past Medical History:   Diagnosis Date   • Arthritis    • Back pain        Past Surgical History:   Procedure Laterality Date   • TONSILLECTOMY  1969       Social History     Socioeconomic History   • Marital status:      Spouse name: Not on file   • Number of children: Not on file   • Years of education: Not on file   • Highest education level: Not on file   Occupational History   • Not on file   Social Needs   • Financial resource strain: Not on file   • Food insecurity     Worry: Not on file     Inability: Not on file   • Transportation needs     Medical: Not on file     Non-medical: Not on file   Tobacco Use   • Smoking status: Never Smoker   • Smokeless tobacco: Never Used   Substance and Sexual Activity   • Alcohol use: No     Comment: occasional   • Drug use: No   • Sexual activity: Yes     Partners: Male     Comment: vasectomy   Lifestyle   • Physical activity     Days per week: Not on file     Minutes per session: Not on file   • Stress: Not on file   Relationships   • Social connections     Talks on phone: Not on file     Gets together: Not on file     Attends Buddhism service: Not on file     Active member of club or organization: Not on file     Attends meetings of clubs or organizations: Not on file     Relationship status: Not on file   • Intimate partner violence     Fear of current or ex partner: Not on file     Emotionally abused: Not on file     Physically abused: Not on file     Forced sexual activity:  "Not on file   Other Topics Concern   • Not on file   Social History Narrative   • Not on file       Family History   Problem Relation Age of Onset   • Cancer Mother         Breast Cancer       Allergies   Allergen Reactions   • Pcn [Penicillins] Hives       Current Outpatient Medications   Medication Sig Dispense Refill   • furosemide (LASIX) 20 MG Tab Take 1 Tab by mouth every day. 30 Tab 3   • vitamin E (VITAMIN E) 1000 UNIT Cap Take 1 Cap by mouth every day.     • Alpha-Lipoic Acid 100 MG Cap Take  by mouth.     • flecainide (TAMBOCOR) 100 MG Tab Take 1 Tab by mouth 2 times a day. 60 Tab 5   • acetaminophen (TYLENOL) 500 MG Tab Take 1-2 Tabs by mouth every 6 hours as needed. 30 Tab 0   • zinc sulfate (ZINCATE) 220 (50 Zn) MG Cap Take 220 mg by mouth every day. Zinc with copper     • ascorbic acid (ASCORBIC ACID) 500 MG Tab Take 500 mg by mouth every day.     • Cholecalciferol (VITAMIN D3 PO) Take 1 Cap by mouth every day.     • flecainide (TAMBOCOR) 50 MG tablet Take 1 Tab by mouth 2 times a day. 60 Tab 5   • EVENING PRIMROSE OIL PO Take  by mouth.     • cyclobenzaprine (FLEXERIL) 10 MG Tab Take 1 Tab by mouth 3 times a day as needed. (Patient not taking: Reported on 4/29/2019) 30 Tab 0   • therapeutic multivitamin-minerals (THERAGRAN-M) Tab Take 1 Tab by mouth every day.     • Methylsulfonylmethane (MSM) Powder Take 10 mL by mouth every day.       No current facility-administered medications for this visit.        Physical Exam:  Vitals:    12/30/20 1248   BP: 138/84   BP Location: Right arm   Patient Position: Sitting   BP Cuff Size: Large adult   Pulse: 100   SpO2: 95%   Weight: 103 kg (227 lb)   Height: 1.651 m (5' 5\")     General appearance: NAD, conversant  HEENT: PERRL, neck is supple with FROM  Lungs: Clear to auscultation, normal respiratory effort  CV: RRR, no murmurs/rubs/gallops, no JVD  Abdomen: Soft, non-tender with normal bowel sounds  Extremities: 1+ bilateral peripheral edema, non pitting, no " clubbing or cyanosis  Skin: No rash, lesions, or ulcers  Psych: Alert and oriented to person, place and time    Data:  Labs reviewed    Prior echo/stress reviewed:  LVEF 65%    EKG interpreted by me:  NSR    Impression/Plan:  1. PAF (paroxysmal atrial fibrillation) (HCC)  EKG    proBrain Natriuretic Peptide, NT    Comp Metabolic Panel    CBC WITH DIFFERENTIAL    ZIO PATCH MONITOR    EC-ECHOCARDIOGRAM COMPLETE W/ CONT     -I am not sure what is going on with the peripheral edema, it is non pitting and may not be cardiac related  -Trial of small lasix dose  -Check BNP/Echo  -I would like repeat zio to assess AF burden    Tyree Her MD

## 2021-01-13 LAB — EKG IMPRESSION: NORMAL

## 2021-03-15 DIAGNOSIS — Z23 NEED FOR VACCINATION: ICD-10-CM

## 2021-04-01 ENCOUNTER — NON-PROVIDER VISIT (OUTPATIENT)
Dept: CARDIOLOGY | Facility: MEDICAL CENTER | Age: 56
End: 2021-04-01
Attending: INTERNAL MEDICINE

## 2021-04-01 ENCOUNTER — TELEPHONE (OUTPATIENT)
Dept: CARDIOLOGY | Facility: MEDICAL CENTER | Age: 56
End: 2021-04-01

## 2021-04-01 DIAGNOSIS — I47.19 ATRIAL TACHYCARDIA (HCC): ICD-10-CM

## 2021-04-01 DIAGNOSIS — I48.0 PAF (PAROXYSMAL ATRIAL FIBRILLATION) (HCC): ICD-10-CM

## 2021-04-01 NOTE — TELEPHONE ENCOUNTER
Patient enrolled in the 14 day Zio XT Patch Holter monitoring program per Tyree Her MD.  In-Clinic hookup.    >Currently pending EOS.

## 2021-04-27 PROCEDURE — 93248 EXT ECG>7D<15D REV&INTERPJ: CPT | Performed by: INTERNAL MEDICINE

## 2021-04-27 PROCEDURE — 93246 EXT ECG>7D<15D RECORDING: CPT | Performed by: INTERNAL MEDICINE

## 2021-05-17 ENCOUNTER — OFFICE VISIT (OUTPATIENT)
Dept: CARDIOLOGY | Facility: MEDICAL CENTER | Age: 56
End: 2021-05-17

## 2021-05-17 VITALS
WEIGHT: 221 LBS | HEART RATE: 81 BPM | DIASTOLIC BLOOD PRESSURE: 76 MMHG | OXYGEN SATURATION: 95 % | BODY MASS INDEX: 36.82 KG/M2 | HEIGHT: 65 IN | SYSTOLIC BLOOD PRESSURE: 124 MMHG

## 2021-05-17 DIAGNOSIS — I48.0 PAF (PAROXYSMAL ATRIAL FIBRILLATION) (HCC): ICD-10-CM

## 2021-05-17 DIAGNOSIS — Z51.81 ENCOUNTER FOR MONITORING FLECAINIDE THERAPY: ICD-10-CM

## 2021-05-17 DIAGNOSIS — Z79.899 ENCOUNTER FOR MONITORING FLECAINIDE THERAPY: ICD-10-CM

## 2021-05-17 PROCEDURE — 99214 OFFICE O/P EST MOD 30 MIN: CPT | Performed by: INTERNAL MEDICINE

## 2021-05-17 PROCEDURE — 93000 ELECTROCARDIOGRAM COMPLETE: CPT | Performed by: INTERNAL MEDICINE

## 2021-05-17 NOTE — PROGRESS NOTES
Arrhythmia Clinic Note (Established patient)    DOS: 5/17/2021    Chief complaint/Reason for consult: F/u PAF    Interval History:  Patient is a 54 yo F. History of obesity and symptomatic PAF. Initially loaded with amiodarone in the hospital setting. Established care with us. Eventually switched to flecainide. Initially was not working as well. We increased dose to 100 BID. Working well now. Outpatient ambulatory monitoring negative for recurrence.     ROS (+ highlighted in red):  General--Negative for fatigue, weight loss or weight gain  Cardiovascular--Negative for CP, orthopnea, PND    Past Medical History:   Diagnosis Date   • Arthritis    • Back pain        Past Surgical History:   Procedure Laterality Date   • TONSILLECTOMY  1969       Social History     Socioeconomic History   • Marital status:      Spouse name: Not on file   • Number of children: Not on file   • Years of education: Not on file   • Highest education level: Not on file   Occupational History   • Not on file   Tobacco Use   • Smoking status: Never Smoker   • Smokeless tobacco: Never Used   Substance and Sexual Activity   • Alcohol use: No     Comment: occasional   • Drug use: No   • Sexual activity: Yes     Partners: Male     Comment: vasectomy   Other Topics Concern   • Not on file   Social History Narrative   • Not on file     Social Determinants of Health     Financial Resource Strain:    • Difficulty of Paying Living Expenses:    Food Insecurity:    • Worried About Running Out of Food in the Last Year:    • Ran Out of Food in the Last Year:    Transportation Needs:    • Lack of Transportation (Medical):    • Lack of Transportation (Non-Medical):    Physical Activity:    • Days of Exercise per Week:    • Minutes of Exercise per Session:    Stress:    • Feeling of Stress :    Social Connections:    • Frequency of Communication with Friends and Family:    • Frequency of Social Gatherings with Friends and Family:    • Attends Jew  "Services:    • Active Member of Clubs or Organizations:    • Attends Club or Organization Meetings:    • Marital Status:    Intimate Partner Violence:    • Fear of Current or Ex-Partner:    • Emotionally Abused:    • Physically Abused:    • Sexually Abused:        Family History   Problem Relation Age of Onset   • Cancer Mother         Breast Cancer       Allergies   Allergen Reactions   • Pcn [Penicillins] Hives       Current Outpatient Medications   Medication Sig Dispense Refill   • flecainide (TAMBOCOR) 100 MG Tab Take 1 tablet by mouth 2 times a day. 180 tablet 0   • vitamin E (VITAMIN E) 1000 UNIT Cap Take 1 Cap by mouth every day.     • Alpha-Lipoic Acid 100 MG Cap Take  by mouth.     • acetaminophen (TYLENOL) 500 MG Tab Take 1-2 Tabs by mouth every 6 hours as needed. 30 Tab 0   • zinc sulfate (ZINCATE) 220 (50 Zn) MG Cap Take 220 mg by mouth every day. Zinc with copper     • ascorbic acid (ASCORBIC ACID) 500 MG Tab Take 500 mg by mouth every day.     • Cholecalciferol (VITAMIN D3 PO) Take 1 Cap by mouth every day.       No current facility-administered medications for this visit.       Physical Exam:  Vitals:    05/17/21 1349   BP: 124/76   BP Location: Left arm   Patient Position: Sitting   BP Cuff Size: Adult   Pulse: 81   SpO2: 95%   Weight: 100 kg (221 lb)   Height: 1.651 m (5' 5\")     General appearance: NAD, conversant  HEENT: PERRL, neck is supple with FROM  Lungs: Clear to auscultation, normal respiratory effort  CV: RRR, no murmurs/rubs/gallops, no JVD  Abdomen: Soft, non-tender with normal bowel sounds  Extremities: No peripheral edema, no clubbing or cyanosis  Skin: No rash, lesions, or ulcers  Psych: Alert and oriented to person, place and time    Data:  Labs reviewed    Prior echo/stress reviewed:  LVEF 65%      EKG interpreted by me:  NSR, QRS duration stable and WNL    Impression/Plan:  1. PAF (paroxysmal atrial fibrillation) (HCC)  EKG   2. Encounter for monitoring flecainide therapy   "     -Doing well on flecainide  -No changes to 100 BID regimen of the 1C agent  -CHADSVasc is 1, off anticoagulation  -F/u in 6 mo for surveillance EKG    Tyree Her MD

## 2021-05-26 LAB — EKG IMPRESSION: NORMAL

## 2021-06-07 DIAGNOSIS — I48.0 PAF (PAROXYSMAL ATRIAL FIBRILLATION) (HCC): ICD-10-CM

## 2021-06-07 RX ORDER — FLECAINIDE ACETATE 100 MG/1
TABLET ORAL
Qty: 180 TABLET | Refills: 3 | Status: SHIPPED | OUTPATIENT
Start: 2021-06-07 | End: 2022-06-02

## 2022-01-06 ENCOUNTER — APPOINTMENT (OUTPATIENT)
Dept: CARDIOLOGY | Facility: MEDICAL CENTER | Age: 57
End: 2022-01-06

## 2022-01-27 ENCOUNTER — OFFICE VISIT (OUTPATIENT)
Dept: CARDIOLOGY | Facility: MEDICAL CENTER | Age: 57
End: 2022-01-27

## 2022-01-27 ENCOUNTER — TELEPHONE (OUTPATIENT)
Dept: CARDIOLOGY | Facility: MEDICAL CENTER | Age: 57
End: 2022-01-27

## 2022-01-27 VITALS
BODY MASS INDEX: 38.32 KG/M2 | DIASTOLIC BLOOD PRESSURE: 80 MMHG | RESPIRATION RATE: 18 BRPM | HEART RATE: 74 BPM | HEIGHT: 65 IN | OXYGEN SATURATION: 96 % | SYSTOLIC BLOOD PRESSURE: 142 MMHG | WEIGHT: 230 LBS

## 2022-01-27 DIAGNOSIS — Z79.899 ENCOUNTER FOR MONITORING FLECAINIDE THERAPY: ICD-10-CM

## 2022-01-27 DIAGNOSIS — Z51.81 ENCOUNTER FOR MONITORING FLECAINIDE THERAPY: ICD-10-CM

## 2022-01-27 DIAGNOSIS — I48.0 PAF (PAROXYSMAL ATRIAL FIBRILLATION) (HCC): ICD-10-CM

## 2022-01-27 PROCEDURE — 99214 OFFICE O/P EST MOD 30 MIN: CPT | Performed by: INTERNAL MEDICINE

## 2022-01-27 PROCEDURE — 93000 ELECTROCARDIOGRAM COMPLETE: CPT | Performed by: INTERNAL MEDICINE

## 2022-01-27 NOTE — TELEPHONE ENCOUNTER
SS    Hello,    This patient just saw Dr. Her today and is asking if he can order some labs for her to have done prior to seeing him in 6 months, since she has not had labs done in a while. She will labs mailed out to her. If you have any questions she can be reached at [phone].      Thank you!

## 2022-02-08 LAB — EKG IMPRESSION: NORMAL

## 2022-05-20 ENCOUNTER — APPOINTMENT (OUTPATIENT)
Dept: CARDIOLOGY | Facility: MEDICAL CENTER | Age: 57
End: 2022-05-20

## 2022-06-01 DIAGNOSIS — I48.0 PAF (PAROXYSMAL ATRIAL FIBRILLATION) (HCC): ICD-10-CM

## 2022-06-02 RX ORDER — FLECAINIDE ACETATE 100 MG/1
TABLET ORAL
Qty: 180 TABLET | Refills: 3 | Status: SHIPPED | OUTPATIENT
Start: 2022-06-02 | End: 2022-10-31 | Stop reason: SDUPTHER

## 2022-10-20 ENCOUNTER — TELEPHONE (OUTPATIENT)
Dept: CARDIOLOGY | Facility: MEDICAL CENTER | Age: 57
End: 2022-10-20

## 2022-10-20 NOTE — TELEPHONE ENCOUNTER
Phone Number Called: [phone]    Call outcome: Spoke to patient regarding message below.    Message: Called to follow up with patient regarding financial concerns. Patient is actually concerned about her cost of appointments. Advised patient that there is nothing that I can do for her about the cost of her appointment. Discussed with patient the cost of the Flecainide and using [facility]. Patient verbalizes understanding.

## 2022-10-20 NOTE — TELEPHONE ENCOUNTER
MAYRA    Caller: - Corinne    Topic/issue: Kelsey would like a call back, to discuss her financial issues, as she does not have insurance, and her spouse is on disability.  She would like to check on her medication scripts for lower costs.     Callback Number: [phone]    Thank you   Haydee TYLER

## 2022-10-31 ENCOUNTER — APPOINTMENT (OUTPATIENT)
Dept: CARDIOLOGY | Facility: MEDICAL CENTER | Age: 57
End: 2022-10-31

## 2022-10-31 DIAGNOSIS — I48.0 PAF (PAROXYSMAL ATRIAL FIBRILLATION) (HCC): ICD-10-CM

## 2022-10-31 NOTE — TELEPHONE ENCOUNTER
SS    Caller: Corinne A Snyder     Medication Name and Dosage:  flecainide (TAMBOCOR) 100 MG Tab [[id number]]    Please call your pharmacy and have them send us a refill request or speak to a live representative, RX number may have changed.    Medication amount left: 2 days of medication left at home.   1 REFILL left with the pharmacy.- Pt would like a refill called in for 90 days.     Preferred Pharmacy: [facility] in [place]    Other questions (Topic): The Pharmacy states they still have the 1 refill for a 30 day supply. She was told by the [employer] she would be able to use a coupon if the refill was for 90 days through [facility].and to reach out to us to have a new prescription sent over for 90 days.     Callback Number (Will only call for issues): PH:  [phone]

## 2022-11-02 RX ORDER — FLECAINIDE ACETATE 100 MG/1
100 TABLET ORAL 2 TIMES DAILY
Qty: 180 TABLET | Refills: 0 | Status: SHIPPED | OUTPATIENT
Start: 2022-11-02 | End: 2022-12-02 | Stop reason: SDUPTHER

## 2022-12-02 ENCOUNTER — TELEPHONE (OUTPATIENT)
Dept: CARDIOLOGY | Facility: MEDICAL CENTER | Age: 57
End: 2022-12-02

## 2022-12-02 DIAGNOSIS — I48.0 PAF (PAROXYSMAL ATRIAL FIBRILLATION) (HCC): ICD-10-CM

## 2022-12-02 RX ORDER — FLECAINIDE ACETATE 100 MG/1
100 TABLET ORAL 2 TIMES DAILY
Qty: 60 TABLET | Refills: 0 | Status: SHIPPED | OUTPATIENT
Start: 2022-12-02 | End: 2022-12-05 | Stop reason: SDUPTHER

## 2022-12-02 NOTE — TELEPHONE ENCOUNTER
SS    Caller: -Corinne    Medication Name and Dosage:    flecainide (TAMBOCOR) 100 MG Tab [[id number]]    Medication amount left: 2    Preferred Pharmacy:   [facility]    Other questions (Topic): N/A    Callback Number (Will only call for issues): [phone]    Thank you,   Haydee TYLER

## 2022-12-03 NOTE — TELEPHONE ENCOUNTER
30 day courtesy refill sent.    Task deferred to schedulers to schedule for [facility] via staff message.

## 2022-12-03 NOTE — TELEPHONE ENCOUNTER
Is the patient due for a refill? Yes    Was the patient seen the past year? Yes    Date of last office visit: 01/27/22    Does the patient have an upcoming appointment?  No       Provider to refill:SS    Does the patients insurance require a 100 day supply?  No

## 2022-12-05 ENCOUNTER — TELEPHONE (OUTPATIENT)
Dept: CARDIOLOGY | Facility: MEDICAL CENTER | Age: 57
End: 2022-12-05

## 2022-12-05 DIAGNOSIS — I48.0 PAF (PAROXYSMAL ATRIAL FIBRILLATION) (HCC): ICD-10-CM

## 2022-12-05 RX ORDER — FLECAINIDE ACETATE 100 MG/1
100 TABLET ORAL 2 TIMES DAILY
Qty: 60 TABLET | Refills: 0 | Status: SHIPPED | OUTPATIENT
Start: 2022-12-05 | End: 2022-12-05 | Stop reason: SDUPTHER

## 2022-12-05 RX ORDER — FLECAINIDE ACETATE 100 MG/1
100 TABLET ORAL 2 TIMES DAILY
Qty: 180 TABLET | Refills: 0 | Status: SHIPPED | OUTPATIENT
Start: 2022-12-05

## 2022-12-05 NOTE — TELEPHONE ENCOUNTER
SS        Caller: Corinne A Snyder      Medication Name and Dosage: flecainide (TAMBOCOR) 100 MG Tab        Please call your pharmacy and have them send us a refill request or speak to a live representative, RX number may have changed.    Medication amount left: 0    Preferred Pharmacy: [facility]    Other questions (Topic): n/a    Callback Number (Will only call for issues): [phone]      Thank you    -Yandel MOJICA

## 2022-12-06 ENCOUNTER — TELEPHONE (OUTPATIENT)
Dept: CARDIOLOGY | Facility: MEDICAL CENTER | Age: 57
End: 2022-12-06

## 2022-12-06 NOTE — TELEPHONE ENCOUNTER
----- Message from Erendira Dietrich R.N. sent at 12/2/2022  6:20 PM PST -----  Please schedule pt for 6 month FV per SS.  Last seen 1/2022.  Thank you!

## 2023-04-13 NOTE — PROGRESS NOTES
Arrhythmia Clinic Note (Established patient)    DOS: 1/27/2022    Chief complaint/Reason for consult: F/u PAF    Interval History:  Pt is a 57 yo F. History of PAF. Previously managed with amiodarone we switched over to a 1C agent due to potential side effects. She is here for follow-up on antiarrhythmics. Says for the most part she thinks drug working well. Rare breakthrough < a few minutes once a month or so. Has tried intermittent fasting lately but says she has not lost any weight over a few months.     ROS (+in BOLD):  General--fatigue, weight loss or weight gain  Cardiovascular--Negative for CP, orthopnea, PND    Past Medical History:   Diagnosis Date   • Arthritis    • Back pain        Past Surgical History:   Procedure Laterality Date   • TONSILLECTOMY  1969       Social History     Socioeconomic History   • Marital status:      Spouse name: Not on file   • Number of children: Not on file   • Years of education: Not on file   • Highest education level: Not on file   Occupational History   • Not on file   Tobacco Use   • Smoking status: Never Smoker   • Smokeless tobacco: Never Used   Substance and Sexual Activity   • Alcohol use: No     Comment: occasional   • Drug use: No   • Sexual activity: Yes     Partners: Male     Comment: vasectomy   Other Topics Concern   • Not on file   Social History Narrative   • Not on file     Social Determinants of Health     Financial Resource Strain:    • Difficulty of Paying Living Expenses: Not on file   Food Insecurity:    • Worried About Running Out of Food in the Last Year: Not on file   • Ran Out of Food in the Last Year: Not on file   Transportation Needs:    • Lack of Transportation (Medical): Not on file   • Lack of Transportation (Non-Medical): Not on file   Physical Activity:    • Days of Exercise per Week: Not on file   • Minutes of Exercise per Session: Not on file   Stress:    • Feeling of Stress : Not on file   Social Connections:    • Frequency of  Called patient to let them know we did not try to call her yesterday.   "Communication with Friends and Family: Not on file   • Frequency of Social Gatherings with Friends and Family: Not on file   • Attends Holiness Services: Not on file   • Active Member of Clubs or Organizations: Not on file   • Attends Club or Organization Meetings: Not on file   • Marital Status: Not on file   Intimate Partner Violence:    • Fear of Current or Ex-Partner: Not on file   • Emotionally Abused: Not on file   • Physically Abused: Not on file   • Sexually Abused: Not on file   Housing Stability:    • Unable to Pay for Housing in the Last Year: Not on file   • Number of Places Lived in the Last Year: Not on file   • Unstable Housing in the Last Year: Not on file       Family History   Problem Relation Age of Onset   • Cancer Mother         Breast Cancer       Allergies   Allergen Reactions   • Pcn [Penicillins] Hives       Current Outpatient Medications   Medication Sig Dispense Refill   • flecainide (TAMBOCOR) 100 MG Tab TAKE ONE TABLET BY MOUTH TWO TIMES A DAY. 180 tablet 3   • vitamin E (VITAMIN E) 1000 UNIT Cap Take 1 Cap by mouth every day.     • Alpha-Lipoic Acid 100 MG Cap Take  by mouth.     • acetaminophen (TYLENOL) 500 MG Tab Take 1-2 Tabs by mouth every 6 hours as needed. 30 Tab 0   • zinc sulfate (ZINCATE) 220 (50 Zn) MG Cap Take 220 mg by mouth every day. Zinc with copper     • ascorbic acid (ASCORBIC ACID) 500 MG Tab Take 500 mg by mouth every day.     • Cholecalciferol (VITAMIN D3 PO) Take 1 Cap by mouth every day.       No current facility-administered medications for this visit.       Physical Exam:  Vitals:    01/27/22 1456   BP: 142/80   BP Location: Left arm   Patient Position: Sitting   BP Cuff Size: Adult   Pulse: 74   Resp: 18   SpO2: 96%   Weight: 104 kg (230 lb)   Height: 1.651 m (5' 5\")     General appearance: NAD, conversant  HEENT: PERRL, neck is supple with FROM  Lungs: Clear to auscultation, normal respiratory effort  CV: RRR, no murmurs/rubs/gallops, no JVD  Abdomen: Soft, " non-tender with normal bowel sounds  Extremities: No peripheral edema, no clubbing or cyanosis  Skin: No rash, lesions, or ulcers  Psych: Alert and oriented to person, place and time    Data:  Labs reviewed    Prior echo/stress reviewed:  LVEF 65%    EKG interpreted by me:  NSR    Impression/Plan:  1. PAF (paroxysmal atrial fibrillation) ([facility])  EKG   2. Encounter for monitoring flecainide therapy       -EKG looks good on the flecainide  -Working well antiarrhythmic wise  -She asked about other options besides flec and we discussed alternative antiarrhythmics, rate control, and catheter ablation  -She will stay on the flec now, think about her options  -F/u in 6 mo    Tyree Her MD

## 2023-08-09 ENCOUNTER — TELEPHONE (OUTPATIENT)
Dept: CARDIOLOGY | Facility: MEDICAL CENTER | Age: 58
End: 2023-08-09